# Patient Record
Sex: FEMALE | Race: BLACK OR AFRICAN AMERICAN | Employment: UNEMPLOYED | URBAN - METROPOLITAN AREA
[De-identification: names, ages, dates, MRNs, and addresses within clinical notes are randomized per-mention and may not be internally consistent; named-entity substitution may affect disease eponyms.]

---

## 2019-03-21 ENCOUNTER — HOSPITAL ENCOUNTER (INPATIENT)
Age: 44
LOS: 2 days | Discharge: HOME | DRG: 178 | End: 2019-03-23
Attending: EMERGENCY MEDICINE | Admitting: INTERNAL MEDICINE
Payer: MEDICAID

## 2019-03-21 ENCOUNTER — APPOINTMENT (OUTPATIENT)
Dept: GENERAL RADIOLOGY | Age: 44
DRG: 178 | End: 2019-03-21

## 2019-03-21 ENCOUNTER — APPOINTMENT (OUTPATIENT)
Dept: CT IMAGING | Age: 44
DRG: 178 | End: 2019-03-21

## 2019-03-21 DIAGNOSIS — J18.9 PNEUMONIA DUE TO ORGANISM: Primary | ICD-10-CM

## 2019-03-21 DIAGNOSIS — N17.9 AKI (ACUTE KIDNEY INJURY) (HCC): ICD-10-CM

## 2019-03-21 DIAGNOSIS — R09.02 HYPOXIA: ICD-10-CM

## 2019-03-21 DIAGNOSIS — J69.0 ASPIRATION PNEUMONIA OF RIGHT LOWER LOBE DUE TO GASTRIC SECRETIONS (HCC): ICD-10-CM

## 2019-03-21 DIAGNOSIS — F19.10 POLYSUBSTANCE ABUSE (HCC): ICD-10-CM

## 2019-03-21 DIAGNOSIS — T79.6XXA TRAUMATIC RHABDOMYOLYSIS, INITIAL ENCOUNTER (HCC): ICD-10-CM

## 2019-03-21 PROBLEM — E87.29 RESPIRATORY ACIDOSIS: Status: ACTIVE | Noted: 2019-03-21

## 2019-03-21 PROBLEM — R74.01 TRANSAMINITIS: Status: ACTIVE | Noted: 2019-03-21

## 2019-03-21 PROBLEM — M62.82 NON-TRAUMATIC RHABDOMYOLYSIS: Status: ACTIVE | Noted: 2019-03-21

## 2019-03-21 PROBLEM — G93.41 METABOLIC ENCEPHALOPATHY: Status: ACTIVE | Noted: 2019-03-21

## 2019-03-21 LAB
-: ABNORMAL
ABSOLUTE EOS #: 0 K/UL (ref 0–0.4)
ABSOLUTE IMMATURE GRANULOCYTE: 0 K/UL (ref 0–0.3)
ABSOLUTE LYMPH #: 0.3 K/UL (ref 1–4.8)
ABSOLUTE MONO #: 1.04 K/UL (ref 0.1–0.8)
ACETAMINOPHEN LEVEL: <5 UG/ML (ref 10–30)
ALBUMIN SERPL-MCNC: 4.5 G/DL (ref 3.5–5.2)
ALBUMIN/GLOBULIN RATIO: 1.2 (ref 1–2.5)
ALLEN TEST: ABNORMAL
ALP BLD-CCNC: 72 U/L (ref 35–104)
ALT SERPL-CCNC: 369 U/L (ref 5–33)
AMORPHOUS: ABNORMAL
AMPHETAMINE SCREEN URINE: NEGATIVE
ANION GAP SERPL CALCULATED.3IONS-SCNC: 17 MMOL/L (ref 9–17)
ANION GAP: 11 MMOL/L (ref 7–16)
ANION GAP: 12 MMOL/L (ref 7–16)
ANION GAP: 7 MMOL/L (ref 7–16)
AST SERPL-CCNC: 420 U/L
BACTERIA: ABNORMAL
BARBITURATE SCREEN URINE: NEGATIVE
BASOPHILS # BLD: 0 % (ref 0–2)
BASOPHILS ABSOLUTE: 0 K/UL (ref 0–0.2)
BENZODIAZEPINE SCREEN, URINE: NEGATIVE
BILIRUB SERPL-MCNC: <0.1 MG/DL (ref 0.3–1.2)
BILIRUBIN DIRECT: <0.08 MG/DL
BILIRUBIN URINE: NEGATIVE
BILIRUBIN, INDIRECT: ABNORMAL MG/DL (ref 0–1)
BNP INTERPRETATION: NORMAL
BUN BLDV-MCNC: 19 MG/DL (ref 6–20)
BUN/CREAT BLD: ABNORMAL (ref 9–20)
BUPRENORPHINE URINE: ABNORMAL
CALCIUM SERPL-MCNC: 8.7 MG/DL (ref 8.6–10.4)
CANNABINOID SCREEN URINE: POSITIVE
CASTS UA: ABNORMAL /LPF (ref 0–2)
CHLORIDE BLD-SCNC: 99 MMOL/L (ref 98–107)
CO2: 21 MMOL/L (ref 20–31)
COCAINE METABOLITE, URINE: POSITIVE
COLOR: YELLOW
COMMENT UA: ABNORMAL
CREAT SERPL-MCNC: 1.72 MG/DL (ref 0.5–0.9)
CRYSTALS, UA: ABNORMAL /HPF
DIFFERENTIAL TYPE: ABNORMAL
EOSINOPHILS RELATIVE PERCENT: 0 % (ref 1–4)
EPITHELIAL CELLS UA: ABNORMAL /HPF (ref 0–5)
ETHANOL PERCENT: <0.01 %
ETHANOL: <10 MG/DL
FIO2: ABNORMAL
GFR AFRICAN AMERICAN: 39 ML/MIN
GFR NON-AFRICAN AMERICAN: 32 ML/MIN
GFR NON-AFRICAN AMERICAN: 34 ML/MIN
GFR NON-AFRICAN AMERICAN: 39 ML/MIN
GFR NON-AFRICAN AMERICAN: >60 ML/MIN
GFR SERPL CREATININE-BSD FRML MDRD: 42 ML/MIN
GFR SERPL CREATININE-BSD FRML MDRD: 47 ML/MIN
GFR SERPL CREATININE-BSD FRML MDRD: >60 ML/MIN
GFR SERPL CREATININE-BSD FRML MDRD: ABNORMAL ML/MIN/{1.73_M2}
GFR SERPL CREATININE-BSD FRML MDRD: NORMAL ML/MIN/{1.73_M2}
GLOBULIN: ABNORMAL G/DL (ref 1.5–3.8)
GLUCOSE BLD-MCNC: 100 MG/DL (ref 74–100)
GLUCOSE BLD-MCNC: 108 MG/DL (ref 70–99)
GLUCOSE BLD-MCNC: 150 MG/DL (ref 74–100)
GLUCOSE BLD-MCNC: 77 MG/DL (ref 74–100)
GLUCOSE URINE: NEGATIVE
HCG QUALITATIVE: NEGATIVE
HCO3 VENOUS: 18.3 MMOL/L (ref 22–29)
HCO3 VENOUS: 23.5 MMOL/L (ref 22–29)
HCO3 VENOUS: 24.8 MMOL/L (ref 22–29)
HCT VFR BLD CALC: 38 % (ref 36.3–47.1)
HEMOGLOBIN: 12.2 G/DL (ref 11.9–15.1)
IMMATURE GRANULOCYTES: 0 %
INR BLD: 1
KETONES, URINE: NEGATIVE
LEUKOCYTE ESTERASE, URINE: NEGATIVE
LIPASE: 21 U/L (ref 13–60)
LYMPHOCYTES # BLD: 2 % (ref 24–44)
MAGNESIUM: 2.2 MG/DL (ref 1.6–2.6)
MCH RBC QN AUTO: 30.2 PG (ref 25.2–33.5)
MCHC RBC AUTO-ENTMCNC: 32.1 G/DL (ref 28.4–34.8)
MCV RBC AUTO: 94.1 FL (ref 82.6–102.9)
MDMA URINE: ABNORMAL
METHADONE SCREEN, URINE: NEGATIVE
METHAMPHETAMINE, URINE: ABNORMAL
MODE: ABNORMAL
MONOCYTES # BLD: 7 % (ref 1–7)
MORPHOLOGY: NORMAL
MUCUS: ABNORMAL
MYOGLOBIN: 802 NG/ML (ref 25–58)
NEGATIVE BASE EXCESS, VEN: 5 (ref 0–2)
NEGATIVE BASE EXCESS, VEN: 5 (ref 0–2)
NEGATIVE BASE EXCESS, VEN: 8 (ref 0–2)
NITRITE, URINE: NEGATIVE
NRBC AUTOMATED: 0 PER 100 WBC
O2 DEVICE/FLOW/%: ABNORMAL
O2 SAT, VEN: 30 % (ref 60–85)
O2 SAT, VEN: 42 % (ref 60–85)
O2 SAT, VEN: 53 % (ref 60–85)
OPIATES, URINE: NEGATIVE
OTHER OBSERVATIONS UA: ABNORMAL
OXYCODONE SCREEN URINE: NEGATIVE
PARTIAL THROMBOPLASTIN TIME: 23.1 SEC (ref 20.5–30.5)
PATIENT TEMP: ABNORMAL
PCO2, VEN: 39.9 MM HG (ref 41–51)
PCO2, VEN: 57.5 MM HG (ref 41–51)
PCO2, VEN: 68.9 MM HG (ref 41–51)
PDW BLD-RTO: 13.7 % (ref 11.8–14.4)
PH UA: 5 (ref 5–8)
PH VENOUS: 7.16 (ref 7.32–7.43)
PH VENOUS: 7.22 (ref 7.32–7.43)
PH VENOUS: 7.27 (ref 7.32–7.43)
PHENCYCLIDINE, URINE: NEGATIVE
PHOSPHORUS: 5.8 MG/DL (ref 2.6–4.5)
PLATELET # BLD: 258 K/UL (ref 138–453)
PLATELET ESTIMATE: ABNORMAL
PMV BLD AUTO: 9.1 FL (ref 8.1–13.5)
PO2, VEN: 23.4 MM HG (ref 30–50)
PO2, VEN: 30.6 MM HG (ref 30–50)
PO2, VEN: 31.8 MM HG (ref 30–50)
POC CHLORIDE: 102 MMOL/L (ref 98–107)
POC CHLORIDE: 105 MMOL/L (ref 98–107)
POC CHLORIDE: 116 MMOL/L (ref 98–107)
POC CREATININE: 0.87 MG/DL (ref 0.51–1.19)
POC CREATININE: 1.46 MG/DL (ref 0.51–1.19)
POC CREATININE: 1.63 MG/DL (ref 0.51–1.19)
POC HEMATOCRIT: 27 % (ref 36–46)
POC HEMATOCRIT: 38 % (ref 36–46)
POC HEMATOCRIT: 41 % (ref 36–46)
POC HEMOGLOBIN: 12.9 G/DL (ref 12–16)
POC HEMOGLOBIN: 13.9 G/DL (ref 12–16)
POC HEMOGLOBIN: 9.1 G/DL (ref 12–16)
POC IONIZED CALCIUM: 0.9 MMOL/L (ref 1.15–1.33)
POC IONIZED CALCIUM: 1.07 MMOL/L (ref 1.15–1.33)
POC IONIZED CALCIUM: 1.11 MMOL/L (ref 1.15–1.33)
POC LACTIC ACID: 1.23 MMOL/L (ref 0.56–1.39)
POC LACTIC ACID: 2.51 MMOL/L (ref 0.56–1.39)
POC LACTIC ACID: 5.06 MMOL/L (ref 0.56–1.39)
POC PCO2 TEMP: ABNORMAL MM HG
POC PH TEMP: ABNORMAL
POC PO2 TEMP: ABNORMAL MM HG
POC POTASSIUM: 3.8 MMOL/L (ref 3.5–4.5)
POC POTASSIUM: 4.3 MMOL/L (ref 3.5–4.5)
POC POTASSIUM: 4.5 MMOL/L (ref 3.5–4.5)
POC SODIUM: 137 MMOL/L (ref 138–146)
POC SODIUM: 137 MMOL/L (ref 138–146)
POC SODIUM: 145 MMOL/L (ref 138–146)
POSITIVE BASE EXCESS, VEN: ABNORMAL (ref 0–3)
POTASSIUM SERPL-SCNC: 4.7 MMOL/L (ref 3.7–5.3)
PRO-BNP: 286 PG/ML
PROPOXYPHENE, URINE: ABNORMAL
PROTEIN UA: ABNORMAL
PROTHROMBIN TIME: 10.5 SEC (ref 9–12)
RBC # BLD: 4.04 M/UL (ref 3.95–5.11)
RBC # BLD: ABNORMAL 10*6/UL
RBC UA: ABNORMAL /HPF (ref 0–2)
RENAL EPITHELIAL, UA: ABNORMAL /HPF
SALICYLATE LEVEL: <1 MG/DL (ref 3–10)
SAMPLE SITE: ABNORMAL
SEG NEUTROPHILS: 91 % (ref 36–66)
SEGMENTED NEUTROPHILS ABSOLUTE COUNT: 13.56 K/UL (ref 1.8–7.7)
SODIUM BLD-SCNC: 137 MMOL/L (ref 135–144)
SPECIFIC GRAVITY UA: 1.02 (ref 1–1.03)
TEST INFORMATION: ABNORMAL
TOTAL CK: 992 U/L (ref 26–192)
TOTAL CO2, VENOUS: 20 MMOL/L (ref 23–30)
TOTAL CO2, VENOUS: 25 MMOL/L (ref 23–30)
TOTAL CO2, VENOUS: 27 MMOL/L (ref 23–30)
TOTAL PROTEIN: 8.2 G/DL (ref 6.4–8.3)
TOXIC TRICYCLIC SC,BLOOD: NEGATIVE
TRICHOMONAS: ABNORMAL
TRICYCLIC ANTIDEPRESSANTS, UR: ABNORMAL
TROPONIN INTERP: ABNORMAL
TROPONIN INTERP: ABNORMAL
TROPONIN T: ABNORMAL NG/ML
TROPONIN T: ABNORMAL NG/ML
TROPONIN, HIGH SENSITIVITY: 42 NG/L (ref 0–14)
TROPONIN, HIGH SENSITIVITY: 51 NG/L (ref 0–14)
TURBIDITY: ABNORMAL
URINE HGB: ABNORMAL
UROBILINOGEN, URINE: NORMAL
WBC # BLD: 14.9 K/UL (ref 3.5–11.3)
WBC # BLD: ABNORMAL 10*3/UL
WBC UA: ABNORMAL /HPF (ref 0–5)
YEAST: ABNORMAL

## 2019-03-21 PROCEDURE — 6360000002 HC RX W HCPCS: Performed by: STUDENT IN AN ORGANIZED HEALTH CARE EDUCATION/TRAINING PROGRAM

## 2019-03-21 PROCEDURE — 83690 ASSAY OF LIPASE: CPT

## 2019-03-21 PROCEDURE — 83874 ASSAY OF MYOGLOBIN: CPT

## 2019-03-21 PROCEDURE — 83605 ASSAY OF LACTIC ACID: CPT

## 2019-03-21 PROCEDURE — 80048 BASIC METABOLIC PNL TOTAL CA: CPT

## 2019-03-21 PROCEDURE — 96375 TX/PRO/DX INJ NEW DRUG ADDON: CPT

## 2019-03-21 PROCEDURE — 82550 ASSAY OF CK (CPK): CPT

## 2019-03-21 PROCEDURE — B32SYZZ COMPUTERIZED TOMOGRAPHY (CT SCAN) OF RIGHT PULMONARY ARTERY USING OTHER CONTRAST: ICD-10-PCS | Performed by: INTERNAL MEDICINE

## 2019-03-21 PROCEDURE — 6360000002 HC RX W HCPCS

## 2019-03-21 PROCEDURE — 96374 THER/PROPH/DIAG INJ IV PUSH: CPT

## 2019-03-21 PROCEDURE — 82803 BLOOD GASES ANY COMBINATION: CPT

## 2019-03-21 PROCEDURE — 82805 BLOOD GASES W/O2 SATURATION: CPT

## 2019-03-21 PROCEDURE — 82947 ASSAY GLUCOSE BLOOD QUANT: CPT

## 2019-03-21 PROCEDURE — 2580000003 HC RX 258: Performed by: STUDENT IN AN ORGANIZED HEALTH CARE EDUCATION/TRAINING PROGRAM

## 2019-03-21 PROCEDURE — 82330 ASSAY OF CALCIUM: CPT

## 2019-03-21 PROCEDURE — 81001 URINALYSIS AUTO W/SCOPE: CPT

## 2019-03-21 PROCEDURE — 6360000004 HC RX CONTRAST MEDICATION: Performed by: EMERGENCY MEDICINE

## 2019-03-21 PROCEDURE — 84300 ASSAY OF URINE SODIUM: CPT

## 2019-03-21 PROCEDURE — 6360000002 HC RX W HCPCS: Performed by: EMERGENCY MEDICINE

## 2019-03-21 PROCEDURE — 96376 TX/PRO/DX INJ SAME DRUG ADON: CPT

## 2019-03-21 PROCEDURE — 84100 ASSAY OF PHOSPHORUS: CPT

## 2019-03-21 PROCEDURE — 80076 HEPATIC FUNCTION PANEL: CPT

## 2019-03-21 PROCEDURE — 85730 THROMBOPLASTIN TIME PARTIAL: CPT

## 2019-03-21 PROCEDURE — 6370000000 HC RX 637 (ALT 250 FOR IP): Performed by: EMERGENCY MEDICINE

## 2019-03-21 PROCEDURE — G0480 DRUG TEST DEF 1-7 CLASSES: HCPCS

## 2019-03-21 PROCEDURE — 80307 DRUG TEST PRSMV CHEM ANLYZR: CPT

## 2019-03-21 PROCEDURE — 80074 ACUTE HEPATITIS PANEL: CPT

## 2019-03-21 PROCEDURE — 84295 ASSAY OF SERUM SODIUM: CPT

## 2019-03-21 PROCEDURE — 84132 ASSAY OF SERUM POTASSIUM: CPT

## 2019-03-21 PROCEDURE — B32TYZZ COMPUTERIZED TOMOGRAPHY (CT SCAN) OF LEFT PULMONARY ARTERY USING OTHER CONTRAST: ICD-10-PCS | Performed by: INTERNAL MEDICINE

## 2019-03-21 PROCEDURE — 85014 HEMATOCRIT: CPT

## 2019-03-21 PROCEDURE — 99285 EMERGENCY DEPT VISIT HI MDM: CPT

## 2019-03-21 PROCEDURE — 82435 ASSAY OF BLOOD CHLORIDE: CPT

## 2019-03-21 PROCEDURE — 85610 PROTHROMBIN TIME: CPT

## 2019-03-21 PROCEDURE — 93005 ELECTROCARDIOGRAM TRACING: CPT

## 2019-03-21 PROCEDURE — 82565 ASSAY OF CREATININE: CPT

## 2019-03-21 PROCEDURE — 84703 CHORIONIC GONADOTROPIN ASSAY: CPT

## 2019-03-21 PROCEDURE — 84484 ASSAY OF TROPONIN QUANT: CPT

## 2019-03-21 PROCEDURE — 2060000000 HC ICU INTERMEDIATE R&B

## 2019-03-21 PROCEDURE — 71045 X-RAY EXAM CHEST 1 VIEW: CPT

## 2019-03-21 PROCEDURE — 83880 ASSAY OF NATRIURETIC PEPTIDE: CPT

## 2019-03-21 PROCEDURE — 2580000003 HC RX 258: Performed by: EMERGENCY MEDICINE

## 2019-03-21 PROCEDURE — 71260 CT THORAX DX C+: CPT

## 2019-03-21 PROCEDURE — 87040 BLOOD CULTURE FOR BACTERIA: CPT

## 2019-03-21 PROCEDURE — 70450 CT HEAD/BRAIN W/O DYE: CPT

## 2019-03-21 PROCEDURE — 85025 COMPLETE CBC W/AUTO DIFF WBC: CPT

## 2019-03-21 PROCEDURE — 36415 COLL VENOUS BLD VENIPUNCTURE: CPT

## 2019-03-21 PROCEDURE — 83735 ASSAY OF MAGNESIUM: CPT

## 2019-03-21 PROCEDURE — 82570 ASSAY OF URINE CREATININE: CPT

## 2019-03-21 RX ORDER — SODIUM CHLORIDE 9 MG/ML
INJECTION, SOLUTION INTRAVENOUS CONTINUOUS
Status: ACTIVE | OUTPATIENT
Start: 2019-03-21 | End: 2019-03-22

## 2019-03-21 RX ORDER — ONDANSETRON 2 MG/ML
4 INJECTION INTRAMUSCULAR; INTRAVENOUS ONCE
Status: COMPLETED | OUTPATIENT
Start: 2019-03-21 | End: 2019-03-21

## 2019-03-21 RX ORDER — NALOXONE HYDROCHLORIDE 0.4 MG/ML
0.4 INJECTION, SOLUTION INTRAMUSCULAR; INTRAVENOUS; SUBCUTANEOUS ONCE
Status: COMPLETED | OUTPATIENT
Start: 2019-03-21 | End: 2019-03-21

## 2019-03-21 RX ORDER — NALOXONE HYDROCHLORIDE 1 MG/ML
INJECTION INTRAMUSCULAR; INTRAVENOUS; SUBCUTANEOUS
Status: DISPENSED
Start: 2019-03-21 | End: 2019-03-21

## 2019-03-21 RX ORDER — SODIUM CHLORIDE 9 MG/ML
INJECTION, SOLUTION INTRAVENOUS CONTINUOUS
Status: DISCONTINUED | OUTPATIENT
Start: 2019-03-21 | End: 2019-03-21

## 2019-03-21 RX ORDER — SODIUM CHLORIDE 0.9 % (FLUSH) 0.9 %
10 SYRINGE (ML) INJECTION PRN
Status: CANCELLED | OUTPATIENT
Start: 2019-03-21

## 2019-03-21 RX ORDER — ONDANSETRON 2 MG/ML
4 INJECTION INTRAMUSCULAR; INTRAVENOUS EVERY 6 HOURS PRN
Status: DISCONTINUED | OUTPATIENT
Start: 2019-03-21 | End: 2019-03-22

## 2019-03-21 RX ORDER — ONDANSETRON 2 MG/ML
INJECTION INTRAMUSCULAR; INTRAVENOUS
Status: COMPLETED
Start: 2019-03-21 | End: 2019-03-21

## 2019-03-21 RX ORDER — 0.9 % SODIUM CHLORIDE 0.9 %
1000 INTRAVENOUS SOLUTION INTRAVENOUS ONCE
Status: COMPLETED | OUTPATIENT
Start: 2019-03-21 | End: 2019-03-21

## 2019-03-21 RX ORDER — NALOXONE HYDROCHLORIDE 0.4 MG/ML
0.4 INJECTION, SOLUTION INTRAMUSCULAR; INTRAVENOUS; SUBCUTANEOUS ONCE
Status: DISCONTINUED | OUTPATIENT
Start: 2019-03-21 | End: 2019-03-22

## 2019-03-21 RX ORDER — SODIUM CHLORIDE 0.9 % (FLUSH) 0.9 %
10 SYRINGE (ML) INJECTION EVERY 12 HOURS SCHEDULED
Status: CANCELLED | OUTPATIENT
Start: 2019-03-21

## 2019-03-21 RX ORDER — ACETAMINOPHEN 325 MG/1
650 TABLET ORAL EVERY 4 HOURS PRN
Status: DISCONTINUED | OUTPATIENT
Start: 2019-03-21 | End: 2019-03-23 | Stop reason: HOSPADM

## 2019-03-21 RX ORDER — AZITHROMYCIN 250 MG/1
500 TABLET, FILM COATED ORAL ONCE
Status: COMPLETED | OUTPATIENT
Start: 2019-03-21 | End: 2019-03-21

## 2019-03-21 RX ADMIN — CEFTRIAXONE SODIUM 1 G: 1 INJECTION, POWDER, FOR SOLUTION INTRAMUSCULAR; INTRAVENOUS at 12:13

## 2019-03-21 RX ADMIN — IOVERSOL 75 ML: 741 INJECTION INTRA-ARTERIAL; INTRAVENOUS at 11:03

## 2019-03-21 RX ADMIN — SODIUM CHLORIDE 1000 ML: 9 INJECTION, SOLUTION INTRAVENOUS at 10:41

## 2019-03-21 RX ADMIN — ENOXAPARIN SODIUM 40 MG: 40 INJECTION SUBCUTANEOUS at 18:35

## 2019-03-21 RX ADMIN — SODIUM CHLORIDE: 9 INJECTION, SOLUTION INTRAVENOUS at 20:03

## 2019-03-21 RX ADMIN — ONDANSETRON HYDROCHLORIDE 4 MG: 2 INJECTION, SOLUTION INTRAMUSCULAR; INTRAVENOUS at 11:58

## 2019-03-21 RX ADMIN — ONDANSETRON 4 MG: 2 INJECTION INTRAMUSCULAR; INTRAVENOUS at 19:46

## 2019-03-21 RX ADMIN — ONDANSETRON HYDROCHLORIDE 4 MG: 2 INJECTION, SOLUTION INTRAMUSCULAR; INTRAVENOUS at 15:31

## 2019-03-21 RX ADMIN — AZITHROMYCIN 500 MG: 250 TABLET, FILM COATED ORAL at 12:46

## 2019-03-21 RX ADMIN — ONDANSETRON 4 MG: 2 INJECTION INTRAMUSCULAR; INTRAVENOUS at 11:58

## 2019-03-21 RX ADMIN — NALOXONE HYDROCHLORIDE 0.4 MG: 0.4 INJECTION, SOLUTION INTRAMUSCULAR; INTRAVENOUS; SUBCUTANEOUS at 11:54

## 2019-03-21 ASSESSMENT — ENCOUNTER SYMPTOMS
TROUBLE SWALLOWING: 0
COUGH: 0
SHORTNESS OF BREATH: 0
VOMITING: 0
ABDOMINAL PAIN: 0
NAUSEA: 1
BACK PAIN: 0
WHEEZING: 0
SORE THROAT: 0
ABDOMINAL DISTENTION: 0

## 2019-03-21 NOTE — ED PROVIDER NOTES
[address]   Emergency Department Encounter  Emergency Medicine Resident     Pt Name: Wilfrid Curry  MRN: [id number]  [place] 1975  Date of evaluation: 3/21/19  PCP:  No primary care provider on file. CHIEF COMPLAINT       Chief Complaint   Patient presents with    Fatigue     Pt reports hanging out with friend last night, drank some beers and smoked some marijuana and indulged cocaine. Pt reports cocaine may have been laced with something because she has \"never felt this way. \" No c/o chest pain. HISTORY OF PRESENT ILLNESS  (Location/Symptom, Timing/Onset, Context/Setting, Quality, Duration, Modifying Factors, Severity.)    Wilfrid Curry is a 39 y.o. female who presents with fatigue and altered mental status. Patient admitted to polysubstance abuse tonight before coming emergency room. Patient admitted to using alcohol, marijuana, and cocaine. Patient states that she has used cocaine in the past but has never felt as fatigue nor as sluggish and she is now. Patient believes that the cocaine may have been laced with something. Patient states that she does not remember how many alcoholic drink she had and states that she had a \"30 bag\" of cocaine last night. Patient is arousable to stimuli. Patient responds appropriately to questions. She is conscious, and oriented ×4. She is sluggish to respond but does answer questions appropriately. Patient states she does not remember large portions of the night. PAST MEDICAL / SURGICAL / SOCIAL / FAMILY HISTORY    has a past medical history of PTSD (post-traumatic stress disorder). has a past surgical history that includes Rotator cuff repair (2003).     Social History     Socioeconomic History    Marital status:      Spouse name: Not on file    Number of children: Not on file    Years of education: Not on file    Highest education level: Not on file   Occupational History    Not on file   Social Needs    Negative for pallor. Neurological: Negative for dizziness and weakness. Psychiatric/Behavioral: Negative for confusion. The patient is not nervous/anxious. All other systems reviewed and are negative. PHYSICAL EXAM   (up to 7 for level 4, 8 or more for level 5)    INITIAL VITALS:   ED Triage Vitals   BP Temp Temp Source Pulse Resp SpO2 Height Weight   03/21/19 1010 03/21/19 1005 03/21/19 1005 03/21/19 1010 03/21/19 1312 03/21/19 1005 03/21/19 1005 03/21/19 1005   91/71 97.4 °F (36.3 °C) Oral 76 17 92 % 5' 8\" (1.727 m) 200 lb (90.7 kg)       Physical Exam   Constitutional: She is oriented to person, place, and time. She appears well-developed and well-nourished. She does not appear ill. No distress. HENT:   Head: Normocephalic. Head is without raccoon's eyes, without Adair's sign, without abrasion, without contusion, without laceration, without right periorbital erythema and without left periorbital erythema. Nose:       Mouth/Throat: Uvula is midline, oropharynx is clear and moist and mucous membranes are normal. Normal dentition. Small, nonbleeding abrasion to the patient's nose. Swelling without bruising to patient's upper right lip   Eyes: Pupils are equal, round, and reactive to light. Conjunctivae and EOM are normal.   Neck: Normal range of motion. No JVD present. No spinous process tenderness and no muscular tenderness present. No tracheal deviation and normal range of motion present. Cardiovascular: Normal rate, regular rhythm, normal heart sounds and intact distal pulses. No murmur heard. Pulses:       Carotid pulses are 2+ on the right side, and 2+ on the left side. Radial pulses are 2+ on the right side, and 2+ on the left side. Pulmonary/Chest: Effort normal and breath sounds normal. No respiratory distress. She has no decreased breath sounds. She has no wheezes. Abdominal: Soft. Normal appearance and bowel sounds are normal. She exhibits no distension.  There is no tenderness. Musculoskeletal: She exhibits no edema or deformity. Neurological: She is oriented to person, place, and time. She has normal strength. She is not disoriented. Coordination normal.   Skin: Skin is warm and dry. Capillary refill takes less than 2 seconds. Psychiatric: She has a normal mood and affect. Her behavior is normal. Thought content normal. Her speech is delayed. She exhibits abnormal recent memory. Nursing note and vitals reviewed. DIFFERENTIAL  DIAGNOSIS   PLAN (LABS / IMAGING / EKG):  Orders Placed This Encounter   Procedures    Culture Blood #1    Culture Blood #1    XR CHEST PORTABLE    CT Chest Pulmonary Embolism W Contrast    CT Head WO Contrast    CBC Auto Differential    Basic Metabolic Panel    Protime-INR    APTT    Brain Natriuretic Peptide    Lipase    Hepatic Function Panel    HCG Qualitative, Serum    Magnesium    Phosphorus    Urinalysis Reflex to Culture    TOX SCR, BLD, ED    Troponin    Hemoglobin and hematocrit, blood    SODIUM (POC)    POTASSIUM (POC)    CHLORIDE (POC)    CALCIUM, IONIC (POC)    Hemoglobin and hematocrit, blood    SODIUM (POC)    POTASSIUM (POC)    CHLORIDE (POC)    CALCIUM, IONIC (POC)    Microscopic Urinalysis    Urine Drug Screen    Blood Gas, Venous    Hemoglobin and hematocrit, blood    SODIUM (POC)    POTASSIUM (POC)    CHLORIDE (POC)    CALCIUM, IONIC (POC)    CK    Myoglobin, Serum    CK    DIET GENERAL;    Vital signs per unit routine    Notify physician    Up with assistance    Telemetry Monitoring    Full Code    Inpatient consult to Critical Care    Inpatient consult to Internal Medicine    POC Blood Gas and Chemistry    POC G4: LACTIC ACID,BLOOD GAS INCLUDES CALC.  BASE EXCESS, SO2    Venous Blood Gas, POC    Creatinine W/GFR Point of Care    Lactic Acid, POC    POCT Glucose    Anion Gap (Calc) POC    Venous Blood Gas, POC    Creatinine W/GFR Point of Care    Lactic Acid, POC    POC Lactic Acid 2.51 (*)     All other components within normal limits   VENOUS BLOOD GAS, POINT OF CARE - Abnormal; Notable for the following components:    pH, Henri 7.269 (*)     pCO2, Henri 39.9 (*)     HCO3, Venous 18.3 (*)     Total CO2, Venous 20 (*)     Negative Base Excess, Henri 8 (*)     O2 Sat, Henri 53 (*)     All other components within normal limits   CULTURE BLOOD #1   CULTURE BLOOD #1   PROTIME-INR   APTT   BRAIN NATRIURETIC PEPTIDE   LIPASE   HCG, SERUM, QUALITATIVE   MAGNESIUM   HGB/HCT   POTASSIUM (POC)   CHLORIDE (POC)   HGB/HCT   POTASSIUM (POC)   CHLORIDE (POC)   SODIUM (POC)   POTASSIUM (POC)   BLOOD GAS, VENOUS   CK   POC BLOOD GAS AND CHEMISTRY   POC G4: LACTIC ACID,BLOOD GAS INCLUDES CALC. BASE EXCESS, SO2   ANION GAP (CALC) POC   POCT GLUCOSE   ANION GAP (CALC) POC   CREATININE W/GFR POINT OF CARE   LACTIC ACID,POINT OF CARE   POCT GLUCOSE   ANION GAP (CALC) POC       RADIOLOGY:  Ct Head Wo Contrast    Result Date: 3/21/2019  EXAMINATION: CT OF THE HEAD WITHOUT CONTRAST  3/21/2019 10:54 am TECHNIQUE: CT of the head was performed without the administration of intravenous contrast. Dose modulation, iterative reconstruction, and/or weight based adjustment of the mA/kV was utilized to reduce the radiation dose to as low as reasonably achievable. COMPARISON: None. HISTORY: ORDERING SYSTEM PROVIDED HISTORY: Patient with traumatic head injury TECHNOLOGIST PROVIDED HISTORY: FINDINGS: BRAIN/VENTRICLES: There is no acute intracranial hemorrhage, mass effect or midline shift. No abnormal extra-axial fluid collection. The gray-white differentiation is maintained without evidence of an acute infarct. There is no evidence of hydrocephalus. ORBITS: The visualized portion of the orbits demonstrate no acute abnormality. SINUSES: The visualized paranasal sinuses and mastoid air cells demonstrate no acute abnormality. Mild mucoperiosteal thickening along the walls of the maxillary sinuses and ethmoid air cells. SOFT TISSUES/SKULL:  No acute abnormality of the visualized skull or soft tissues. No acute intracranial abnormality. Xr Chest Portable    Result Date: 3/21/2019  EXAMINATION: SINGLE XRAY VIEW OF THE CHEST 3/21/2019 10:48 am COMPARISON: February 19, 2012 HISTORY: ORDERING SYSTEM PROVIDED HISTORY: Shortness of breath and fatigue with hypoxia TECHNOLOGIST PROVIDED HISTORY: Shortness of breath and fatigue with hypoxia FINDINGS: The cardiomediastinal silhouette is normal in size. There is patchy airspace disease involving the right lower lobe consistent with pneumonia. No pleural effusion or pneumothorax is present. Acute right lower lobe pneumonia. Radiographic follow-up recommended to ensure resolution. Ct Chest Pulmonary Embolism W Contrast    Result Date: 3/21/2019  EXAMINATION: CTA OF THE CHEST 3/21/2019 10:54 am TECHNIQUE: CTA of the chest was performed after the administration of intravenous contrast.  Multiplanar reformatted images are provided for review. MIP images are provided for review. Dose modulation, iterative reconstruction, and/or weight based adjustment of the mA/kV was utilized to reduce the radiation dose to as low as reasonably achievable. COMPARISON: Chest radiograph today. HISTORY: ORDERING SYSTEM PROVIDED HISTORY: SHORTNESS OF BREATH TECHNOLOGIST PROVIDED HISTORY: Ordering Physician Provided Reason for Exam: fatigue Acuity: Unknown Type of Exam: Unknown Additional signs and symptoms: pt drank some beers, smoked some marijuana, and did cocaine last night. Does not feel right now Relevant Medical/Surgical History: no history except allergy to darvocet FINDINGS: Pulmonary Arteries: Pulmonary arteries are adequately opacified for evaluation. Dilution of contrast and respiratory motion artifact degrades evaluation of the subsegmental branches. No evidence of intraluminal filling defect to suggest pulmonary embolism. Main pulmonary artery is normal in caliber.  Mediastinum: No evidence of mediastinal lymphadenopathy. The heart and pericardium demonstrate no acute abnormality. There is no acute abnormality of the thoracic aorta. Small amount of fluid in the thoracic esophagus. Lungs/pleura: Respiratory motion artifact and expiratory phase of imaging. Regional areas of alveolar airspace disease are demonstrated bilaterally, more localized in the right upper lobe and perihilar right lower lobe. No pneumothorax. No effusion. The airway is patent. Upper Abdomen: No acute findings. There appears to be a small posterior gastric diverticulum. Soft Tissues/Bones: No acute bone or soft tissue abnormality. No evidence of pulmonary embolism. Bilateral airspace disease, right greater than left consistent with inflammatory process or infection. There is also mild esophageal distention with fluid consistent with reflux. Aspiration should also be considered. EKG    EKG Interpretation    Interpreted by emergency department physician at 1030    Rhythm: normal sinus   Rate: normal  Axis: normal  Ectopy: none  Conduction: normal  ST Segments: normal  T Waves: normal  Q Waves: none    Clinical Impression: Sinus rhythm, rate of 87. No ST segment changes. No arrhythmia or ectopy seen    All EKG's are interpreted by the Emergency Department Physician whoeither signs or Co-signs this chart in the absence of a cardiologist.    EMERGENCY DEPARTMENT COURSE:       MDM  Number of Diagnoses or Management Options  Hypoxia: new, needed workup  Pneumonia due to organism: new, needed workup  Polysubstance abuse [facility]): new, needed workup  Traumatic rhabdomyolysis, initial encounter [facility]): new, needed workup  Diagnosis management comments: The patient's drug use last night, concerned that the cocaine was indeed placed with another substance. Patient believed that it may have been laced with fentanyl. Patient was sluggish, fatigue.   Patient did initially respond to her 0.5 mg of Narcan which resulted in increased mental alertness and vomiting. However patient's urine drug screen did not test positive for opiates. Patient did test positive for cocaine and for marijuana but not for opiates. To the fact the patient is cocaine could've been laced with an unknown substance in addition to the hypoxia-resolving-and bilateral pneumonia in addition to patient's rhabdomyolysis patient to be admitted to this medical stepdown service. ICU resident did evaluate patient recommended the patient go to stepdown service. Internal medicine accepted and admitted the patient. Signout was given to Dr. Vimal Naqvi at 2000 hours. Amount and/or Complexity of Data Reviewed  Clinical lab tests: reviewed and ordered  Tests in the radiology section of CPT®: ordered and reviewed  Tests in the medicine section of CPT®: ordered and reviewed  Decide to obtain previous medical records or to obtain history from someone other than the patient: yes  Review and summarize past medical records: yes  Discuss the patient with other providers: yes  Independent visualization of images, tracings, or specimens: yes    Risk of Complications, Morbidity, and/or Mortality  Presenting problems: high  Diagnostic procedures: high  Management options: high    Patient Progress  Patient progress: stable      PROCEDURES:  none    CONSULTS:  IP CONSULT TO CRITICAL CARE  IP CONSULT TO INTERNAL MEDICINE    CRITICAL CARE:  Please see attending note    FINAL IMPRESSION     1. Pneumonia due to organism    2. Hypoxia    3. Traumatic rhabdomyolysis, initial encounter [facility])         [address] / [address]  Admitted 03/21/2019 05:04:55 PM      Evaluation and treatment course in the ED, and plan of care upon discharge was discussed in length with the patient. Patient had no further questions prior to being discharged and was instructed to return to the ED for new or worsening symptoms.   Any changes to existing medications or new prescriptions were reviewed with

## 2019-03-21 NOTE — ED PROVIDER NOTES
[facility] ED  Emergency Department  Emergency Medicine Resident Sign-out     Care of Alexandra Bumps was assumed from Dr. Zulema Vasquez and is being seen for Fatigue (Pt reports hanging out with friend last night, drank some beers and smoked some marijuana and indulged cocaine. Pt reports cocaine may have been laced with something because she has \"never felt this way. \" No c/o chest pain. )  . The patient's initial evaluation and plan have been discussed with the prior provider who initially evaluated the patient.      EMERGENCY DEPARTMENT COURSE / MEDICAL DECISION MAKING:       MEDICATIONS GIVEN:  Orders Placed This Encounter   Medications    0.9 % sodium chloride bolus    ioversol (OPTIRAY) 74 % injection 75 mL    cefTRIAXone (ROCEPHIN) 1 g IVPB in 50 mL D5W minibag    azithromycin (ZITHROMAX) tablet 500 mg    naloxone (NARCAN) 2 MG/2ML injection     ALMA BLACKMON: cabinet override    ondansetron (ZOFRAN) 4 MG/2ML injection     ALMA BLACKMON: cabinet override    ondansetron (ZOFRAN) injection 4 mg    naloxone (NARCAN) injection 0.4 mg    naloxone (NARCAN) injection 0.4 mg    ondansetron (ZOFRAN) injection 4 mg    acetaminophen (TYLENOL) tablet 650 mg    enoxaparin (LOVENOX) injection 40 mg    ondansetron (ZOFRAN) injection 4 mg       LABS / RADIOLOGY:     Labs Reviewed   CBC WITH AUTO DIFFERENTIAL - Abnormal; Notable for the following components:       Result Value    WBC 14.9 (*)     Seg Neutrophils 91 (*)     Lymphocytes 2 (*)     Eosinophils % 0 (*)     Segs Absolute 13.56 (*)     Absolute Lymph # 0.30 (*)     Absolute Mono # 1.04 (*)     All other components within normal limits   BASIC METABOLIC PANEL - Abnormal; Notable for the following components:    Glucose 108 (*)     CREATININE 1.72 (*)     GFR Non- 32 (*)     GFR  39 (*)     All other components within normal limits   TROPONIN - Abnormal; Notable for the following components: GAS, POINT OF CARE - Abnormal; Notable for the following components:    pH, Henri 7.269 (*)     pCO2, Henri 39.9 (*)     HCO3, Venous 18.3 (*)     Total CO2, Venous 20 (*)     Negative Base Excess, Henri 8 (*)     O2 Sat, Henri 53 (*)     All other components within normal limits   CULTURE BLOOD #1   CULTURE BLOOD #1   PROTIME-INR   APTT   BRAIN NATRIURETIC PEPTIDE   LIPASE   HCG, SERUM, QUALITATIVE   MAGNESIUM   HGB/HCT   POTASSIUM (POC)   CHLORIDE (POC)   HGB/HCT   POTASSIUM (POC)   CHLORIDE (POC)   SODIUM (POC)   POTASSIUM (POC)   BLOOD GAS, VENOUS   POC BLOOD GAS AND CHEMISTRY   POC G4: LACTIC ACID,BLOOD GAS INCLUDES CALC. BASE EXCESS, SO2   ANION GAP (CALC) POC   POCT GLUCOSE   ANION GAP (CALC) POC   CREATININE W/GFR POINT OF CARE   LACTIC ACID,POINT OF CARE   POCT GLUCOSE   ANION GAP (CALC) POC       Ct Head Wo Contrast    Result Date: 3/21/2019  EXAMINATION: CT OF THE HEAD WITHOUT CONTRAST  3/21/2019 10:54 am TECHNIQUE: CT of the head was performed without the administration of intravenous contrast. Dose modulation, iterative reconstruction, and/or weight based adjustment of the mA/kV was utilized to reduce the radiation dose to as low as reasonably achievable. COMPARISON: None. HISTORY: ORDERING SYSTEM PROVIDED HISTORY: Patient with traumatic head injury TECHNOLOGIST PROVIDED HISTORY: FINDINGS: BRAIN/VENTRICLES: There is no acute intracranial hemorrhage, mass effect or midline shift. No abnormal extra-axial fluid collection. The gray-white differentiation is maintained without evidence of an acute infarct. There is no evidence of hydrocephalus. ORBITS: The visualized portion of the orbits demonstrate no acute abnormality. SINUSES: The visualized paranasal sinuses and mastoid air cells demonstrate no acute abnormality. Mild mucoperiosteal thickening along the walls of the maxillary sinuses and ethmoid air cells. SOFT TISSUES/SKULL:  No acute abnormality of the visualized skull or soft tissues.      No acute intracranial abnormality. Xr Chest Portable    Result Date: 3/21/2019  EXAMINATION: SINGLE XRAY VIEW OF THE CHEST 3/21/2019 10:48 am COMPARISON: February 19, 2012 HISTORY: ORDERING SYSTEM PROVIDED HISTORY: Shortness of breath and fatigue with hypoxia TECHNOLOGIST PROVIDED HISTORY: Shortness of breath and fatigue with hypoxia FINDINGS: The cardiomediastinal silhouette is normal in size. There is patchy airspace disease involving the right lower lobe consistent with pneumonia. No pleural effusion or pneumothorax is present. Acute right lower lobe pneumonia. Radiographic follow-up recommended to ensure resolution. Ct Chest Pulmonary Embolism W Contrast    Result Date: 3/21/2019  EXAMINATION: CTA OF THE CHEST 3/21/2019 10:54 am TECHNIQUE: CTA of the chest was performed after the administration of intravenous contrast.  Multiplanar reformatted images are provided for review. MIP images are provided for review. Dose modulation, iterative reconstruction, and/or weight based adjustment of the mA/kV was utilized to reduce the radiation dose to as low as reasonably achievable. COMPARISON: Chest radiograph today. HISTORY: ORDERING SYSTEM PROVIDED HISTORY: SHORTNESS OF BREATH TECHNOLOGIST PROVIDED HISTORY: Ordering Physician Provided Reason for Exam: fatigue Acuity: Unknown Type of Exam: Unknown Additional signs and symptoms: pt drank some beers, smoked some marijuana, and did cocaine last night. Does not feel right now Relevant Medical/Surgical History: no history except allergy to darvocet FINDINGS: Pulmonary Arteries: Pulmonary arteries are adequately opacified for evaluation. Dilution of contrast and respiratory motion artifact degrades evaluation of the subsegmental branches. No evidence of intraluminal filling defect to suggest pulmonary embolism. Main pulmonary artery is normal in caliber. Mediastinum: No evidence of mediastinal lymphadenopathy.   The heart and pericardium demonstrate no acute abnormality. There is no acute abnormality of the thoracic aorta. Small amount of fluid in the thoracic esophagus. Lungs/pleura: Respiratory motion artifact and expiratory phase of imaging. Regional areas of alveolar airspace disease are demonstrated bilaterally, more localized in the right upper lobe and perihilar right lower lobe. No pneumothorax. No effusion. The airway is patent. Upper Abdomen: No acute findings. There appears to be a small posterior gastric diverticulum. Soft Tissues/Bones: No acute bone or soft tissue abnormality. No evidence of pulmonary embolism. Bilateral airspace disease, right greater than left consistent with inflammatory process or infection. There is also mild esophageal distention with fluid consistent with reflux. Aspiration should also be considered. RECENT VITALS:     Temp: 97.4 °F (36.3 °C),  Pulse: 89, Resp: 17, BP: 129/87, SpO2: 98 %    This patient is a 39 y.o. Female with altered mental status, polysubstance abuse. Responded to Narcan. Incidental finding of pneumonia. Patient admitted to stepdown ICU. OUTSTANDING TASKS / RECOMMENDATIONS:    1. Awaiting bed     FINAL IMPRESSION:     1. Pneumonia due to organism    2. Hypoxia        DISPOSITION:         DISPOSITION:  []  Discharge   []  Transfer -    [x]  Admission -     []  Against Medical Advice   []  Eloped   FOLLOW-UP: No follow-up provider specified.    DISCHARGE MEDICATIONS: New Prescriptions    No medications on file          Trung Taylor MD  Emergency Medicine Resident  [id number] MD  03/22/19 5483

## 2019-03-21 NOTE — ED PROVIDER NOTES
bilateral infiltrates right greater than left. Will order antibiotics as well as blood cultures. Patient's initial lactate slightly over 2 and will need repeated however VBG shows respiratory acidosis with pH 7.16. Given patient's possible narcotic toxidrome will give Narcan 0.4 mg. After this was done patient woke up much more and has episodes of nausea and vomiting this treated with Zofran and we'll repeat VBG and one hour. Half an hour after Zofran and Narcan were given repeat VBG shows improvement with pH increasing the 7.22 and PCO2 descending to 57. Patient has now had resolution of PCO2 retention and pH is up to 7.26 with improving lactic as well  Of note, patient has been seen by the ICU staff in the emergency room and they wish medicine to admit to stepdown          EKG Interpretation    Interpreted by emergency department physician    Rhythm: normal sinus   Rate: normal at 87 bpm  Axis: normal  Conduction: normal  ST Segments: no acute change  T Waves: no acute change  Q Waves: no acute change    Clinical Impression:  nonspecific EKG. Critical Care  None      (Please note that portions of this note were completed with a voice recognition program. Efforts were made to edit the dictations but occasionally words are mis-transcribed.  Whenever words are used in this note in any gender, they shall be construed as though they were used in the gender appropriate to the circumstances; and whenever words are used in this note in the singular or plural form, they shall be construed as though they were used in the form appropriate to the circumstances.)    MD Miguel Mercer  Attending Emergency Medicine Physician              Rustam Cuellar MD  [address], [place]  19 [address]  [address] MD  19 [address] Ey[address] Leblanc MD  19 [address] Ey[address] Leblanc MD  19 [id number]

## 2019-03-22 LAB
ABSOLUTE EOS #: <0.03 K/UL (ref 0–0.44)
ABSOLUTE IMMATURE GRANULOCYTE: 0.07 K/UL (ref 0–0.3)
ABSOLUTE LYMPH #: 2.29 K/UL (ref 1.1–3.7)
ABSOLUTE MONO #: 0.52 K/UL (ref 0.1–1.2)
ALBUMIN SERPL-MCNC: 3.1 G/DL (ref 3.5–5.2)
ALBUMIN/GLOBULIN RATIO: 1 (ref 1–2.5)
ALLEN TEST: ABNORMAL
ALP BLD-CCNC: 38 U/L (ref 35–104)
ALT SERPL-CCNC: 224 U/L (ref 5–33)
ANION GAP SERPL CALCULATED.3IONS-SCNC: 8 MMOL/L (ref 9–17)
AST SERPL-CCNC: 202 U/L
BASOPHILS # BLD: 0 % (ref 0–2)
BASOPHILS ABSOLUTE: <0.03 K/UL (ref 0–0.2)
BILIRUB SERPL-MCNC: 0.24 MG/DL (ref 0.3–1.2)
BILIRUBIN DIRECT: <0.08 MG/DL
BILIRUBIN, INDIRECT: ABNORMAL MG/DL (ref 0–1)
BUN BLDV-MCNC: 15 MG/DL (ref 6–20)
BUN/CREAT BLD: ABNORMAL (ref 9–20)
CALCIUM SERPL-MCNC: 7.4 MG/DL (ref 8.6–10.4)
CARBOXYHEMOGLOBIN: 1.3 % (ref 0–5)
CHLORIDE BLD-SCNC: 106 MMOL/L (ref 98–107)
CO2: 19 MMOL/L (ref 20–31)
CREAT SERPL-MCNC: 1.02 MG/DL (ref 0.5–0.9)
CREATININE URINE: 108.2 MG/DL (ref 28–217)
DIFFERENTIAL TYPE: ABNORMAL
EKG ATRIAL RATE: 87 BPM
EKG P AXIS: 53 DEGREES
EKG P-R INTERVAL: 118 MS
EKG Q-T INTERVAL: 374 MS
EKG QRS DURATION: 80 MS
EKG QTC CALCULATION (BAZETT): 450 MS
EKG R AXIS: 47 DEGREES
EKG T AXIS: 36 DEGREES
EKG VENTRICULAR RATE: 87 BPM
EOSINOPHILS RELATIVE PERCENT: 0 % (ref 1–4)
FIO2: ABNORMAL
GFR AFRICAN AMERICAN: >60 ML/MIN
GFR NON-AFRICAN AMERICAN: 59 ML/MIN
GFR SERPL CREATININE-BSD FRML MDRD: ABNORMAL ML/MIN/{1.73_M2}
GFR SERPL CREATININE-BSD FRML MDRD: ABNORMAL ML/MIN/{1.73_M2}
GLOBULIN: ABNORMAL G/DL (ref 1.5–3.8)
GLUCOSE BLD-MCNC: 98 MG/DL (ref 70–99)
HAV IGM SER IA-ACNC: NONREACTIVE
HCO3 VENOUS: 21.8 MMOL/L (ref 24–30)
HCT VFR BLD CALC: 28.3 % (ref 36.3–47.1)
HEMOGLOBIN: 9 G/DL (ref 11.9–15.1)
HEPATITIS B CORE IGM ANTIBODY: NONREACTIVE
HEPATITIS B SURFACE ANTIGEN: NONREACTIVE
HEPATITIS C ANTIBODY: NONREACTIVE
IMMATURE GRANULOCYTES: 1 %
LYMPHOCYTES # BLD: 15 % (ref 24–43)
MCH RBC QN AUTO: 30.4 PG (ref 25.2–33.5)
MCHC RBC AUTO-ENTMCNC: 31.8 G/DL (ref 28.4–34.8)
MCV RBC AUTO: 95.6 FL (ref 82.6–102.9)
METHEMOGLOBIN: ABNORMAL % (ref 0–1.5)
MODE: ABNORMAL
MONOCYTES # BLD: 3 % (ref 3–12)
NEGATIVE BASE EXCESS, VEN: 3.3 MMOL/L (ref 0–2)
NOTIFICATION TIME: ABNORMAL
NOTIFICATION: ABNORMAL
NRBC AUTOMATED: 0 PER 100 WBC
O2 DEVICE/FLOW/%: ABNORMAL
O2 SAT, VEN: 82.6 % (ref 60–85)
OXYHEMOGLOBIN: ABNORMAL % (ref 95–98)
PATIENT TEMP: 37
PCO2, VEN, TEMP ADJ: ABNORMAL MMHG (ref 39–55)
PCO2, VEN: 42.1 (ref 39–55)
PDW BLD-RTO: 14 % (ref 11.8–14.4)
PEEP/CPAP: ABNORMAL
PH VENOUS: 7.33 (ref 7.32–7.42)
PH, VEN, TEMP ADJ: ABNORMAL (ref 7.32–7.42)
PLATELET # BLD: 205 K/UL (ref 138–453)
PLATELET ESTIMATE: ABNORMAL
PMV BLD AUTO: 9.9 FL (ref 8.1–13.5)
PO2, VEN, TEMP ADJ: ABNORMAL MMHG (ref 30–50)
PO2, VEN: 49.8 (ref 30–50)
POSITIVE BASE EXCESS, VEN: ABNORMAL MMOL/L (ref 0–2)
POTASSIUM SERPL-SCNC: 4 MMOL/L (ref 3.7–5.3)
POTASSIUM SERPL-SCNC: 5.9 MMOL/L (ref 3.7–5.3)
PSV: ABNORMAL
PT. POSITION: ABNORMAL
RBC # BLD: 2.96 M/UL (ref 3.95–5.11)
RBC # BLD: ABNORMAL 10*6/UL
RESPIRATORY RATE: ABNORMAL
SAMPLE SITE: ABNORMAL
SEG NEUTROPHILS: 81 % (ref 36–65)
SEGMENTED NEUTROPHILS ABSOLUTE COUNT: 12.41 K/UL (ref 1.5–8.1)
SET RATE: ABNORMAL
SODIUM BLD-SCNC: 133 MMOL/L (ref 135–144)
SODIUM,UR: 79 MMOL/L
TEXT FOR RESPIRATORY: ABNORMAL
TOTAL CK: 1314 U/L (ref 26–192)
TOTAL CK: 1326 U/L (ref 26–192)
TOTAL HB: ABNORMAL G/DL (ref 12–16)
TOTAL PROTEIN: 6.1 G/DL (ref 6.4–8.3)
TOTAL RATE: ABNORMAL
VT: ABNORMAL
WBC # BLD: 15.3 K/UL (ref 3.5–11.3)
WBC # BLD: ABNORMAL 10*3/UL

## 2019-03-22 PROCEDURE — 80048 BASIC METABOLIC PNL TOTAL CA: CPT

## 2019-03-22 PROCEDURE — 2060000000 HC ICU INTERMEDIATE R&B

## 2019-03-22 PROCEDURE — 80076 HEPATIC FUNCTION PANEL: CPT

## 2019-03-22 PROCEDURE — 85025 COMPLETE CBC W/AUTO DIFF WBC: CPT

## 2019-03-22 PROCEDURE — 6370000000 HC RX 637 (ALT 250 FOR IP): Performed by: STUDENT IN AN ORGANIZED HEALTH CARE EDUCATION/TRAINING PROGRAM

## 2019-03-22 PROCEDURE — 84132 ASSAY OF SERUM POTASSIUM: CPT

## 2019-03-22 PROCEDURE — 6360000002 HC RX W HCPCS: Performed by: HOSPITALIST

## 2019-03-22 PROCEDURE — 6360000002 HC RX W HCPCS: Performed by: STUDENT IN AN ORGANIZED HEALTH CARE EDUCATION/TRAINING PROGRAM

## 2019-03-22 PROCEDURE — 36415 COLL VENOUS BLD VENIPUNCTURE: CPT

## 2019-03-22 PROCEDURE — 82805 BLOOD GASES W/O2 SATURATION: CPT

## 2019-03-22 PROCEDURE — 99223 1ST HOSP IP/OBS HIGH 75: CPT | Performed by: INTERNAL MEDICINE

## 2019-03-22 PROCEDURE — 82550 ASSAY OF CK (CPK): CPT

## 2019-03-22 PROCEDURE — 2580000003 HC RX 258: Performed by: HOSPITALIST

## 2019-03-22 RX ORDER — MAGNESIUM SULFATE 1 G/100ML
1 INJECTION INTRAVENOUS PRN
Status: DISCONTINUED | OUTPATIENT
Start: 2019-03-22 | End: 2019-03-23 | Stop reason: HOSPADM

## 2019-03-22 RX ORDER — SODIUM CHLORIDE 0.9 % (FLUSH) 0.9 %
10 SYRINGE (ML) INJECTION PRN
Status: DISCONTINUED | OUTPATIENT
Start: 2019-03-22 | End: 2019-03-23 | Stop reason: HOSPADM

## 2019-03-22 RX ORDER — POTASSIUM CHLORIDE 7.45 MG/ML
10 INJECTION INTRAVENOUS PRN
Status: DISCONTINUED | OUTPATIENT
Start: 2019-03-22 | End: 2019-03-23 | Stop reason: HOSPADM

## 2019-03-22 RX ORDER — POTASSIUM CHLORIDE 1.5 G/1.77G
40 POWDER, FOR SOLUTION ORAL PRN
Status: DISCONTINUED | OUTPATIENT
Start: 2019-03-22 | End: 2019-03-23 | Stop reason: HOSPADM

## 2019-03-22 RX ORDER — SODIUM CHLORIDE 0.9 % (FLUSH) 0.9 %
10 SYRINGE (ML) INJECTION EVERY 12 HOURS SCHEDULED
Status: DISCONTINUED | OUTPATIENT
Start: 2019-03-22 | End: 2019-03-23 | Stop reason: HOSPADM

## 2019-03-22 RX ORDER — SODIUM CHLORIDE 9 MG/ML
INJECTION, SOLUTION INTRAVENOUS CONTINUOUS
Status: DISCONTINUED | OUTPATIENT
Start: 2019-03-22 | End: 2019-03-23

## 2019-03-22 RX ORDER — ONDANSETRON 2 MG/ML
4 INJECTION INTRAMUSCULAR; INTRAVENOUS EVERY 6 HOURS PRN
Status: DISCONTINUED | OUTPATIENT
Start: 2019-03-22 | End: 2019-03-23 | Stop reason: HOSPADM

## 2019-03-22 RX ORDER — POTASSIUM CHLORIDE 20 MEQ/1
40 TABLET, EXTENDED RELEASE ORAL PRN
Status: DISCONTINUED | OUTPATIENT
Start: 2019-03-22 | End: 2019-03-23 | Stop reason: HOSPADM

## 2019-03-22 RX ADMIN — Medication 10 ML: at 10:29

## 2019-03-22 RX ADMIN — ONDANSETRON 4 MG: 2 INJECTION INTRAMUSCULAR; INTRAVENOUS at 10:17

## 2019-03-22 RX ADMIN — ONDANSETRON 4 MG: 2 INJECTION INTRAMUSCULAR; INTRAVENOUS at 19:13

## 2019-03-22 RX ADMIN — PIPERACILLIN AND TAZOBACTAM 3.38 G: 3; .375 INJECTION, POWDER, LYOPHILIZED, FOR SOLUTION INTRAVENOUS; PARENTERAL at 01:32

## 2019-03-22 RX ADMIN — PIPERACILLIN AND TAZOBACTAM 3.38 G: 3; .375 INJECTION, POWDER, LYOPHILIZED, FOR SOLUTION INTRAVENOUS; PARENTERAL at 17:49

## 2019-03-22 RX ADMIN — PIPERACILLIN AND TAZOBACTAM 3.38 G: 3; .375 INJECTION, POWDER, LYOPHILIZED, FOR SOLUTION INTRAVENOUS; PARENTERAL at 10:17

## 2019-03-22 RX ADMIN — Medication 10 ML: at 20:02

## 2019-03-22 RX ADMIN — ACETAMINOPHEN 650 MG: 325 TABLET ORAL at 06:21

## 2019-03-22 RX ADMIN — SODIUM CHLORIDE: 9 INJECTION, SOLUTION INTRAVENOUS at 01:31

## 2019-03-22 RX ADMIN — ENOXAPARIN SODIUM 40 MG: 40 INJECTION SUBCUTANEOUS at 13:29

## 2019-03-22 ASSESSMENT — PAIN SCALES - GENERAL
PAINLEVEL_OUTOF10: 10
PAINLEVEL_OUTOF10: 0

## 2019-03-22 NOTE — PLAN OF CARE
Patient free of falls throughout the shift. Fall precautions in place, hourly rounding done, bed alarm on, and patient uses call light appropriately prior to getting out of bed. Will continue to monitor.  Robina El RN

## 2019-03-22 NOTE — ED PROVIDER NOTES
Dr Claribel Steen sign out, fatigue   Pneumonia, cocaine, polysubstance use, pt admitted,       Moises Newman,   03/22/19 0591

## 2019-03-22 NOTE — ED PROVIDER NOTES
S/o from Dr. Thom Torres    38 yo with bilat PNA, hypoxia, stable on O2 andABX, admit to stepdown     Shaan Naidu MD  03/21/19 0698

## 2019-03-22 NOTE — ED PROVIDER NOTES
[facility] ED  Emergency Department  Emergency Medicine Resident Sign-out     Care of Alexandra Bumps was assumed from Dr. Emani Zuluaga and is being seen for Fatigue (Pt reports hanging out with friend last night, drank some beers and smoked some marijuana and indulged cocaine. Pt reports cocaine may have been laced with something because she has \"never felt this way. \" No c/o chest pain. )  . The patient's initial evaluation and plan have been discussed with the prior provider who initially evaluated the patient.      EMERGENCY DEPARTMENT COURSE / MEDICAL DECISION MAKING:       MEDICATIONS GIVEN:  Orders Placed This Encounter   Medications    0.9 % sodium chloride bolus    ioversol (OPTIRAY) 74 % injection 75 mL    cefTRIAXone (ROCEPHIN) 1 g IVPB in 50 mL D5W minibag    azithromycin (ZITHROMAX) tablet 500 mg    naloxone (NARCAN) 2 MG/2ML injection     ALMA BLACKMON: cabinet override    ondansetron (ZOFRAN) 4 MG/2ML injection     ALMA BLACKMON: cabinet override    ondansetron (ZOFRAN) injection 4 mg    naloxone (NARCAN) injection 0.4 mg    naloxone (NARCAN) injection 0.4 mg    ondansetron (ZOFRAN) injection 4 mg    acetaminophen (TYLENOL) tablet 650 mg    enoxaparin (LOVENOX) injection 40 mg    DISCONTD: ondansetron (ZOFRAN) injection 4 mg    DISCONTD: 0.9 % sodium chloride infusion    0.9 % sodium chloride infusion    0.9 % sodium chloride infusion    ondansetron (ZOFRAN) injection 4 mg    DISCONTD: ampicillin-sulbactam (UNASYN) 1.5 g IVPB minibag    DISCONTD: piperacillin-tazobactam (ZOSYN) 3.375 g in dextrose 50 mL IVPB extended infusion (premix)    piperacillin-tazobactam (ZOSYN) 3.375 g in dextrose 5 % 50 mL IVPB (mini-bag)       LABS / RADIOLOGY:     Labs Reviewed   CBC WITH AUTO DIFFERENTIAL - Abnormal; Notable for the following components:       Result Value    WBC 14.9 (*)     Seg Neutrophils 91 (*)     Lymphocytes 2 (*)     Eosinophils % 0 (*)     Segs Absolute 13.56 (*)     Absolute Lymph # 0.30 (*)     Absolute Mono # 1.04 (*)     All other components within normal limits   BASIC METABOLIC PANEL - Abnormal; Notable for the following components:    Glucose 108 (*)     CREATININE 1.72 (*)     GFR Non- 32 (*)     GFR  39 (*)     All other components within normal limits   TROPONIN - Abnormal; Notable for the following components:    Troponin, High Sensitivity 51 (*)     All other components within normal limits   HEPATIC FUNCTION PANEL - Abnormal; Notable for the following components:     (*)      (*)     Total Bilirubin <0.10 (*)     All other components within normal limits   PHOSPHORUS - Abnormal; Notable for the following components:    Phosphorus 5.8 (*)     All other components within normal limits   URINE RT REFLEX TO CULTURE - Abnormal; Notable for the following components:    Turbidity UA CLOUDY (*)     Urine Hgb LARGE (*)     Protein, UA 2+ (*)     All other components within normal limits   TOX SCR, BLD, ED - Abnormal; Notable for the following components:    Salicylate Lvl <1 (*)     Acetaminophen Level <5 (*)     All other components within normal limits   TROPONIN - Abnormal; Notable for the following components:    Troponin, High Sensitivity 42 (*)     All other components within normal limits   SODIUM (POC) - Abnormal; Notable for the following components:    POC Sodium 137 (*)     All other components within normal limits   CALCIUM, IONIC (POC) - Abnormal; Notable for the following components:    POC Ionized Calcium 1.07 (*)     All other components within normal limits   SODIUM (POC) - Abnormal; Notable for the following components:    POC Sodium 137 (*)     All other components within normal limits   CALCIUM, IONIC (POC) - Abnormal; Notable for the following components:    POC Ionized Calcium 1.11 (*)     All other components within normal limits   MICROSCOPIC URINALYSIS - Abnormal; Notable for the following components:    Bacteria, UA FEW (*)     All other components within normal limits   URINE DRUG SCREEN - Abnormal; Notable for the following components:    Cocaine Metabolite, Urine POSITIVE (*)     Cannabinoid Scrn, Ur POSITIVE (*)     All other components within normal limits   HGB/HCT - Abnormal; Notable for the following components:    POC Hemoglobin 9.1 (*)     POC Hematocrit 27 (*)     All other components within normal limits   CHLORIDE (POC) - Abnormal; Notable for the following components:    POC Chloride 116 (*)     All other components within normal limits   CALCIUM, IONIC (POC) - Abnormal; Notable for the following components:    POC Ionized Calcium 0.90 (*)     All other components within normal limits   CK - Abnormal; Notable for the following components: Total  (*)     All other components within normal limits   MYOGLOBIN, SERUM - Abnormal; Notable for the following components:    Myoglobin 802 (*)     All other components within normal limits   CK - Abnormal; Notable for the following components:     Total CK 1,326 (*)     All other components within normal limits   CBC WITH AUTO DIFFERENTIAL - Abnormal; Notable for the following components:    WBC 15.3 (*)     RBC 2.96 (*)     Hemoglobin 9.0 (*)     Hematocrit 28.3 (*)     Seg Neutrophils 81 (*)     Lymphocytes 15 (*)     Eosinophils % 0 (*)     Immature Granulocytes 1 (*)     Segs Absolute 12.41 (*)     All other components within normal limits   BLOOD GAS, VENOUS - Abnormal; Notable for the following components:    HCO3, Venous 21.8 (*)     Negative Base Excess, Henri 3.3 (*)     All other components within normal limits   VENOUS BLOOD GAS, POINT OF CARE - Abnormal; Notable for the following components:    pH, Henri 7.220 (*)     pCO2, Henri 57.5 (*)     pO2, Henri 23.4 (*)     Negative Base Excess, Herni 5 (*)     O2 Sat, Henri 30 (*)     All other components within normal limits   CREATININE W/GFR POINT OF CARE - Abnormal; Notable for the following components:    POC Creatinine 1.46 (*)     GFR Comment 47 (*)     GFR Non- 39 (*)     All other components within normal limits   LACTIC ACID,POINT OF CARE - Abnormal; Notable for the following components:    POC Lactic Acid 5.06 (*)     All other components within normal limits   POCT GLUCOSE - Abnormal; Notable for the following components:    POC Glucose 150 (*)     All other components within normal limits   VENOUS BLOOD GAS, POINT OF CARE - Abnormal; Notable for the following components:    pH, Henri 7.164 (*)     pCO2, Henri 68.9 (*)     Negative Base Excess, Henri 5 (*)     O2 Sat, Henri 42 (*)     All other components within normal limits   CREATININE W/GFR POINT OF CARE - Abnormal; Notable for the following components:    POC Creatinine 1.63 (*)     GFR Comment 42 (*)     GFR Non- 34 (*)     All other components within normal limits   LACTIC ACID,POINT OF CARE - Abnormal; Notable for the following components:    POC Lactic Acid 2.51 (*)     All other components within normal limits   VENOUS BLOOD GAS, POINT OF CARE - Abnormal; Notable for the following components:    pH, Henri 7.269 (*)     pCO2, Henri 39.9 (*)     HCO3, Venous 18.3 (*)     Total CO2, Venous 20 (*)     Negative Base Excess, Henri 8 (*)     O2 Sat, Henri 53 (*)     All other components within normal limits   CULTURE BLOOD #1   CULTURE BLOOD #1   PROTIME-INR   APTT   BRAIN NATRIURETIC PEPTIDE   LIPASE   HCG, SERUM, QUALITATIVE   MAGNESIUM   HGB/HCT   POTASSIUM (POC)   CHLORIDE (POC)   HGB/HCT   POTASSIUM (POC)   CHLORIDE (POC)   SODIUM (POC)   POTASSIUM (POC)   HEPATITIS PANEL, ACUTE   BASIC METABOLIC PANEL W/ REFLEX TO MG FOR LOW K   HEPATIC FUNCTION PANEL   CK   SODIUM, URINE, RANDOM   CREATININE, RANDOM URINE   POC BLOOD GAS AND CHEMISTRY   POC G4: LACTIC ACID,BLOOD GAS INCLUDES CALC.  BASE EXCESS, SO2   ANION GAP (CALC) POC   POCT GLUCOSE   ANION GAP (CALC) POC   CREATININE W/GFR POINT OF CARE   LACTIC ACID,POINT OF CARE POCT GLUCOSE   ANION GAP (CALC) POC       Ct Head Wo Contrast    Result Date: 3/21/2019  EXAMINATION: CT OF THE HEAD WITHOUT CONTRAST  3/21/2019 10:54 am TECHNIQUE: CT of the head was performed without the administration of intravenous contrast. Dose modulation, iterative reconstruction, and/or weight based adjustment of the mA/kV was utilized to reduce the radiation dose to as low as reasonably achievable. COMPARISON: None. HISTORY: ORDERING SYSTEM PROVIDED HISTORY: Patient with traumatic head injury TECHNOLOGIST PROVIDED HISTORY: FINDINGS: BRAIN/VENTRICLES: There is no acute intracranial hemorrhage, mass effect or midline shift. No abnormal extra-axial fluid collection. The gray-white differentiation is maintained without evidence of an acute infarct. There is no evidence of hydrocephalus. ORBITS: The visualized portion of the orbits demonstrate no acute abnormality. SINUSES: The visualized paranasal sinuses and mastoid air cells demonstrate no acute abnormality. Mild mucoperiosteal thickening along the walls of the maxillary sinuses and ethmoid air cells. SOFT TISSUES/SKULL:  No acute abnormality of the visualized skull or soft tissues. No acute intracranial abnormality. Xr Chest Portable    Result Date: 3/21/2019  EXAMINATION: SINGLE XRAY VIEW OF THE CHEST 3/21/2019 10:48 am COMPARISON: February 19, 2012 HISTORY: ORDERING SYSTEM PROVIDED HISTORY: Shortness of breath and fatigue with hypoxia TECHNOLOGIST PROVIDED HISTORY: Shortness of breath and fatigue with hypoxia FINDINGS: The cardiomediastinal silhouette is normal in size. There is patchy airspace disease involving the right lower lobe consistent with pneumonia. No pleural effusion or pneumothorax is present. Acute right lower lobe pneumonia. Radiographic follow-up recommended to ensure resolution.      Ct Chest Pulmonary Embolism W Contrast    Result Date: 3/21/2019  EXAMINATION: CTA OF THE CHEST 3/21/2019 10:54 am TECHNIQUE: CTA of the should also be considered. RECENT VITALS:     Temp: 97.4 °F (36.3 °C),  Pulse: 89, Resp: 17, BP: 119/75, SpO2: 97 %    This patient is a 39 y.o. Female with altered mental status and polysubstance abuse. Pt responded to Narcan. Incidental finding of pneumonia, possibly 2/2 aspiration. Also has traumatic rhabdomyolysis and hypoxia. CT was neg for PE. Pt received abx. 14 [facility] neg. Patient admitted to stepdown. OUTSTANDING TASKS / RECOMMENDATIONS:    1. Awaiting bed     FINAL IMPRESSION:     1. Pneumonia due to organism    2. Hypoxia    3. Traumatic rhabdomyolysis, initial encounter ([facility] [address]    4. Polysubstance abuse ([facility]        DISPOSITION:         DISPOSITION:  []  Discharge   []  Transfer -    [x]  Admission -  Medicine   []  Against Medical Advice   []  Eloped   FOLLOW-UP: No follow-up provider specified.    DISCHARGE MEDICATIONS: New Prescriptions    No medications on file           Shayna Le MD  Emergency Medicine Resident  [address]        Shayna Le MD  Resident  03/22/19 8403

## 2019-03-22 NOTE — ED NOTES
Bedside report received from [facility]. Pt meal tray arrived. Pt to be provided with hospital bed shortly. Family at bedside, questions about treatment and POC answered. Denies further needs. Call light available.         Carissa Avila RN  03/21/19 1924
Dr. Tanner Franco at bedside for re-assessment.        Gely Clancy, RN  03/21/19 1312
EPOC ran. Pt resting on cart, respirations are equal and nonlabored, no distress noted. Pt remains alert and oriented. Pt updated on poc and duration, continue to monitor.       Christos Weston RN  03/21/19 8724
Fluid bolus increased to 150 ml/hr. Pt updated on bed placement. Awaiting repeat chest x ray then to the floor.       Vee Hickey RN  03/22/19 1917
[facility] running      Davida Key RN  03/21/19 5699
Pt 92% on room air, placed on 2L nasal cannula. . Pt on cardiac monitor, pulse ox and bp cuff.       Tee Harvey RN  03/21/19 1037
Pt alert in bed, eating. Pt denies needs. Pt has call light available. Will continue to monitor.       Taina Thomson RN  03/21/19 4017
Pt asleep on cart at this time. Remains on monitor. Pt to be admitted to ICU.       Charlotte Garza RN  03/21/19 7586
Pt asleep, respirations non labored. Family remains at bedside.       Debi Quiros RN  03/21/19 3652
Pt assisted onto bedside commode and tolerated well. Pt back in bed talking on telephone. Pt offered a hospital bed instead of stretcher and declined at this time. Pt updated on poc and duration, awaiting bed for admission, duration unknown.       Stevenson Rasmussen RN  03/21/19 0887
Pt assisted to bedside commode. Dr. Kieran Zhang updated on patients response to narcan.       James Hook RN  03/21/19 2145
Pt back from CT.       River Boland RN  03/21/19 1007
Pt feeling like she is going to vomit, pt given 4mg of IV zofran.       Rachel Trammell RN  03/21/19 0009
Pt given 0.5mg of narcan IV per verbal order from Dr. Justin Real, pt now sitting up in bed, feeling nauseous.       Debi Quiros RN  03/21/19 4824
Pt resting on cart with eyes closed, pt awakens easily to verbal stimuli and is then alert and oriented answering writers questions. Per Dr Ambrose Cisneros ok to hold narcan at this time.       Susannah Guaman RN  03/21/19 8818
Pt resting on cart, respirations are equal and nonlabored, no distress noted.  Pt updated on poc and duration, continue to monitor, awaiting bed for admission     Rojas Oconnor RN  03/21/19 0177
Pt resting on cart, respirations are equal and nonlabored, no distress noted. Pt informed dinner tray has been ordered for her. Pt denies any needs. Pt updated on poc and duration, continue to monitor, awaiting bed for admission, duration unknown.       Terrance Damico RN  03/21/19 1913
Pt resting on cart, respirations are equal and nonlabored, no distress noted. Pt remains alert and oriented x 4 at this time. Pt updated on poc and duration, continue to monitor.       Nevaeh Enriquez RN  03/21/19 3940
Pt resting on cart, respirations are equal and nonlabored, no distress noted. Pt updated on poc and duration, continue to monitor, awaiting floor for report. Meal tray ordered for floor.       Stevenson Rasmussen RN  03/22/19 9061
Pt resting on cart, respirations are equal and nonlabored, no distress noted. Pt updated on poc and duration, continue to monitor.       Maryjo Lindsey RN  03/22/19 3011
Pt resting on cart. Respirations non labored. Pt to be admitted. Family remains at bedside and pt remains on monitor. Resident at bedside to re-evaluate patient.       Sandi Vegas RN  03/21/19 3583
Pt still remains drowsy, sleepy. Pt arouses to voice.       Angie Espinal RN  03/21/19 6056
Pt to ct by stretcher.       Ladi Mcleod RN  03/21/19 4138
Pt transferred into hospital bed. Tolerated well, fresh linens provided. Side table, call light available. Requesting anti-emetics, will inquire with physician.        Cosme Hunter, RN  03/21/19 1936
Report from [employer]. Pt resting on cart, respirations are equal and nonlabored, no distress noted. Pt updated on poc and duration, continue to monitor, awaiting floor floor admission.       Shan Hunter RN  03/22/19 1034
Report to [employer] .       Dufm Lab, RN  03/22/19 4529
pts urine sample collected, labeled and sent to lab.       Jessie Eagle RN  03/21/19 9321
note that portions of this note were completed with a voice recognition program.  Efforts were made to edit the dictations but occasionally words are mis-transcribed.)    MD Macario Austin Figures  Attending Emergency Medicine Physician       Estefany Yusuf MD  03/22/19 6494

## 2019-03-22 NOTE — PROGRESS NOTES
[address] [facility]    PROGRESS NOTE             Date:   3/22/2019  Patient name:  Quinton Lora  Date of admission:  3/21/2019  9:53 AM  MRN:   [id number]  YOB: 1975    CHIEF COMPLAINT     Chief Complaint   Patient presents with    Fatigue     Pt reports hanging out with friend last night, drank some beers and smoked some marijuana and indulged cocaine. Pt reports cocaine may have been laced with something because she has \"never felt this way. \" No c/o chest pain. HISTORY OF PRESENT ILLNESS      The patient is a 39 y.o.  female who is admitted to the hospital for polysubstance abuse. Patient reports hanging out with friends last night and smoking some marijuana and cocaine with few beers. Patient reports she felt very weak and tired after that. Patient states she was in the bed all night and  her mom woke her up this morning and decided to bring her to hospital because of generalized fatigue/weakness. She also reports associated nausea and vomiting. Patient believed that her cocaine was laced with some other substance like fentanyl. She reports she occasionally does cocaine but she never felt like this. Patient denies fever, chills, headache, chest pain, shortness of breath, belly pain, changes in urinary or bowel habits, focal neurological deficit. On arrival to ED patient was arousalable to stimuli and alert and oriented ×4   but very somnolent. She was also hypoxic. She was initially given 0.5 mg of Narcan which improved her mentation and vomiting. Urine drug screen did not show opiates but is positive for cocaine and marijuana. Blood tox also negative for ethanol, Tylenol and salicylate. CTA chest was done that showed bilateral infiltrates likely aspiration pneumonia. CK and myoglobin are also elevated likely because of nontraumatic rhabdomyolysis.     She also had MARY and  elevated liver enzymes likely because of rhabdomyolysis     CURRENT EVALUATION :3/22/2019  Patient states she is feeling stronger but still not back her baseline and feel tired. She is ambulating  VS stable, Afebrile  LFT's and Creatinine improving    Intake/Output Summary (Last 24 hours) at 3/22/2019 0839  Last data filed at 3/22/2019 0531  Gross per 24 hour   Intake 1050 ml   Output --   Net 1050 ml     Patient Vitals for the past 96 hrs (Last 3 readings):   Weight   03/21/19 1005 200 lb (90.7 kg)       PAST MEDICAL HISTORY       has a past medical history of PTSD (post-traumatic stress disorder). PAST SURGICAL HISTORY       has a past surgical history that includes Rotator cuff repair (2003). HOME MEDICATIONS        Prior to Admission medications    Not on File       ALLERGIES      Darvocet a500 [propoxyphene n-acetaminophen]    SOCIAL HISTORY            FAMILY HISTORY      family history includes Cancer in her father; Diabetes in her maternal grandfather; Hypertension in her mother. REVIEW OF SYSTEMS      · Constitutional: Positive for generalized body weakness and fatigue. · Eyes: Negative for visual changes, diplopia  · ENT: Negative for mouth sores, sore throat. · Cardiovascular: Negative for lightheadedness ,chest pain, palpitations   · Respiratory:Negative for Shortness of breath,cough or wheezing. · Gastrointestinal: Negative for nausea/vomiting, change in bowel habits, abdominal pain  · Genitourinary:Negative for change in bladder habits, dysuria, trouble voiding, hematuria.   · Musculoskeletal: Negative for joint pain   · Neurological: Negative for headache, dizziness, change in muscle strength, numbness/tingling  · Psychiatric: negative for change in mood, affect    PHYSICAL EXAM      /75   Pulse 83   Temp 97.4 °F (36.3 °C) (Oral)   Resp 16   Ht 5' 8\" (1.727 m)   Wt 200 lb (90.7 kg)   SpO2 100%   BMI 30.41 kg/m²      · General appearance: well nourished  · HEENT: Head: Normocephalic, no lesions, without

## 2019-03-22 NOTE — H&P
[address] [facility] [address]    HISTORY AND PHYSICAL EXAMINATION            Date:   3/22/2019  Patient name:  Shameka King  Date of admission:  3/21/2019  9:53 AM  MRN:   [id number]  YOB: 1975    CHIEF COMPLAINT     Chief Complaint   Patient presents with    Fatigue     Pt reports hanging out with friend last night, drank some beers and smoked some marijuana and indulged cocaine. Pt reports cocaine may have been laced with something because she has \"never felt this way. \" No c/o chest pain. HISTORY OF PRESENT ILLNESS      The patient is a 39 y.o.  female who is admitted to the hospital for polysubstance abuse. Patient reports hanging out with friends last night and smoking some marijuana and cocaine with few beers. Patient reports she felt very weak and tired after that. Patient states she was in the bed all night and  her mom woke her up this morning and decided to bring her to hospital because of generalized fatigue/weakness. She also reports associated nausea and vomiting. Patient believed that her cocaine was laced with some other substance like fentanyl. She reports she occasionally does cocaine but she never felt like this. Patient denies fever, chills, headache, chest pain, shortness of breath, belly pain, changes in urinary or bowel habits, focal neurological deficit. On arrival to ED patient was arousalable to stimuli and alert and oriented ×4   but very somnolent. She was also hypoxic. She was initially given 0.5 mg of Narcan which improved her mentation and vomiting. Urine drug screen did not show opiates but is positive for cocaine and marijuana. Blood tox also negative for ethanol, Tylenol and salicylate. CTA chest was done that showed bilateral infiltrates likely aspiration pneumonia. CK and myoglobin are also elevated likely because of nontraumatic rhabdomyolysis.     She also had MARY and  elevated liver enzymes likely because of rhabdomyolysis         PAST MEDICAL HISTORY       has a past medical history of PTSD (post-traumatic stress disorder). PAST SURGICAL HISTORY       has a past surgical history that includes Rotator cuff repair (2003). HOME MEDICATIONS        Prior to Admission medications    Not on File       ALLERGIES      Darvocet a500 [propoxyphene n-acetaminophen]    SOCIAL HISTORY            FAMILY HISTORY      family history includes Cancer in her father; Diabetes in her maternal grandfather; Hypertension in her mother. REVIEW OF SYSTEMS      · Constitutional: Positive for generalized body weakness and fatigue. · Eyes: Negative for visual changes, diplopia  · ENT: Negative for mouth sores, sore throat. · Cardiovascular: Negative for lightheadedness ,chest pain, palpitations   · Respiratory:Negative for Shortness of breath,cough or wheezing. · Gastrointestinal: Positive for nausea and vomiting   · Genitourinary:Negative for change in bladder habits, dysuria, hematuria. · Musculoskeletal: Negative for joint pain   · Neurological: Negative for headache, dizziness, change in muscle strength numbness/tingling  · Psychiatric: negative for change in mood, affect    PHYSICAL EXAM      /75   Pulse 98   Temp 97.4 °F (36.3 °C) (Oral)   Resp 15   Ht 5' 8\" (1.727 m)   Wt 200 lb (90.7 kg)   SpO2 97%   BMI 30.41 kg/m²      · General appearance: awake, alert, cooperative  · HEENT: Head: Normocephalic, no lesions, without obvious abnormality. · Lungs: clear to auscultation bilaterally  · Heart: regular rate and rhythm, S1, S2 normal, no murmur  · Abdomen: soft, non-tender; bowel sounds normal; no masses,  no organomegaly  · Extremities: extremities normal, atraumatic, no cyanosis or edema  · Neurological:  Awake, alert, oriented to name, place and time. Cranial nerves II-XII are grossly intact. Reflexes normal and symmetric.  Sensation grossly normal  · Eye no icterus no redness pneumonia. · Continue IV fluid at 200 mL/hour. Monitor CK tomorrow. · Follow LFTs, acute hepatitis panel. · Monitor creatinine. Follow urine sodium and urine creatinine. If creatinine doesn't improve, we will do complete MARY workup. · Check venous blood gas to follow pH and PCO2. · DVT prophylaxis: Lovenox  · Diet: General  · PT/OT evaluation  · Zofran for nausea/vomiting.       Vane Medina MD  PGY-3, Internal medicine resident  [facility], [place], [place]

## 2019-03-22 NOTE — CARE COORDINATION
Case Management Initial Discharge Plan  Luz Gentile,             Met with:patient to discuss discharge plans. Information verified: address, contacts, phone number, , insurance Yes  PCP: No primary care provider on file. Date of last visit: she states that she will find her own PCP    Insurance Provider: Pending Medicaid    Discharge Planning    Living Arrangements:  Children   Support Systems:  Parent, Children    Home has 1 stories  4-6 stairs to climb to get into front door, 0stairs to climb to reach second floor  Location of bedroom/bathroom in home 1st fkoor    Patient able to perform ADL's:Independent    Current Services (outpatient & in home)   DME equipment:   DME provider:     Pharmacy: [facility]   Potential Assistance Purchasing Medications:  No  Does patient want to participate in local refill/ meds to beds program?  No    Potential Assistance Needed:       Patient agreeable to home care: No  [place] of choice provided:  n/a    Prior SNF/Rehab Placement and Facility:   Agreeable to SNF/Rehab: No  [place] of choice provided: n/a   Evaluation: yes    Expected Discharge date:     Patient expects to be discharged to:  Home independent  Follow Up Appointment: Best Day/ Time:      Transportation provider: Family  Transportation arrangements needed for discharge: No    Readmission Risk              Risk of Unplanned Readmission:        18             Does patient have a readmission risk score greater than 14?: Yes  If yes, follow-up appointment must be made within 7 days of discharge. Discharge Plan: Home independent, will make own PCP appt.  JUDSON consult for poly substance abuse, following from a far          Electronically signed by Vianca Ozuna RN on 3/22/19 at 3:05 PM

## 2019-03-23 ENCOUNTER — TELEPHONE (OUTPATIENT)
Dept: INTERNAL MEDICINE CLINIC | Age: 44
End: 2019-03-23

## 2019-03-23 ENCOUNTER — APPOINTMENT (OUTPATIENT)
Dept: GENERAL RADIOLOGY | Age: 44
DRG: 178 | End: 2019-03-23

## 2019-03-23 VITALS
DIASTOLIC BLOOD PRESSURE: 94 MMHG | WEIGHT: 220.9 LBS | OXYGEN SATURATION: 98 % | RESPIRATION RATE: 16 BRPM | HEIGHT: 68 IN | HEART RATE: 84 BPM | BODY MASS INDEX: 33.48 KG/M2 | TEMPERATURE: 98.7 F | SYSTOLIC BLOOD PRESSURE: 151 MMHG

## 2019-03-23 LAB
ABSOLUTE EOS #: <0.03 K/UL (ref 0–0.44)
ABSOLUTE IMMATURE GRANULOCYTE: 0.05 K/UL (ref 0–0.3)
ABSOLUTE LYMPH #: 2.01 K/UL (ref 1.1–3.7)
ABSOLUTE MONO #: 0.51 K/UL (ref 0.1–1.2)
ALBUMIN SERPL-MCNC: 3.4 G/DL (ref 3.5–5.2)
ALBUMIN/GLOBULIN RATIO: 1.2 (ref 1–2.5)
ALP BLD-CCNC: 48 U/L (ref 35–104)
ALT SERPL-CCNC: 163 U/L (ref 5–33)
ANION GAP SERPL CALCULATED.3IONS-SCNC: 11 MMOL/L (ref 9–17)
AST SERPL-CCNC: 81 U/L
BASOPHILS # BLD: 0 % (ref 0–2)
BASOPHILS ABSOLUTE: 0.03 K/UL (ref 0–0.2)
BILIRUB SERPL-MCNC: 0.34 MG/DL (ref 0.3–1.2)
BILIRUBIN DIRECT: 0.12 MG/DL
BILIRUBIN, INDIRECT: 0.22 MG/DL (ref 0–1)
BUN BLDV-MCNC: 13 MG/DL (ref 6–20)
BUN/CREAT BLD: ABNORMAL (ref 9–20)
CALCIUM SERPL-MCNC: 8.2 MG/DL (ref 8.6–10.4)
CHLORIDE BLD-SCNC: 104 MMOL/L (ref 98–107)
CO2: 20 MMOL/L (ref 20–31)
CREAT SERPL-MCNC: 1.04 MG/DL (ref 0.5–0.9)
DIFFERENTIAL TYPE: ABNORMAL
EOSINOPHILS RELATIVE PERCENT: 0 % (ref 1–4)
GFR AFRICAN AMERICAN: >60 ML/MIN
GFR NON-AFRICAN AMERICAN: 58 ML/MIN
GFR SERPL CREATININE-BSD FRML MDRD: ABNORMAL ML/MIN/{1.73_M2}
GFR SERPL CREATININE-BSD FRML MDRD: ABNORMAL ML/MIN/{1.73_M2}
GLOBULIN: ABNORMAL G/DL (ref 1.5–3.8)
GLUCOSE BLD-MCNC: 87 MG/DL (ref 70–99)
HCT VFR BLD CALC: 29 % (ref 36.3–47.1)
HEMOGLOBIN: 9.2 G/DL (ref 11.9–15.1)
IMMATURE GRANULOCYTES: 0 %
LYMPHOCYTES # BLD: 17 % (ref 24–43)
MCH RBC QN AUTO: 29.8 PG (ref 25.2–33.5)
MCHC RBC AUTO-ENTMCNC: 31.7 G/DL (ref 28.4–34.8)
MCV RBC AUTO: 93.9 FL (ref 82.6–102.9)
MONOCYTES # BLD: 4 % (ref 3–12)
NRBC AUTOMATED: 0 PER 100 WBC
PDW BLD-RTO: 13.7 % (ref 11.8–14.4)
PLATELET # BLD: 205 K/UL (ref 138–453)
PLATELET ESTIMATE: ABNORMAL
PMV BLD AUTO: 9.9 FL (ref 8.1–13.5)
POTASSIUM SERPL-SCNC: 4.3 MMOL/L (ref 3.7–5.3)
RBC # BLD: 3.09 M/UL (ref 3.95–5.11)
RBC # BLD: ABNORMAL 10*6/UL
SEG NEUTROPHILS: 79 % (ref 36–65)
SEGMENTED NEUTROPHILS ABSOLUTE COUNT: 8.98 K/UL (ref 1.5–8.1)
SODIUM BLD-SCNC: 135 MMOL/L (ref 135–144)
TOTAL CK: 435 U/L (ref 26–192)
TOTAL PROTEIN: 6.3 G/DL (ref 6.4–8.3)
WBC # BLD: 11.6 K/UL (ref 3.5–11.3)
WBC # BLD: ABNORMAL 10*3/UL

## 2019-03-23 PROCEDURE — 80048 BASIC METABOLIC PNL TOTAL CA: CPT

## 2019-03-23 PROCEDURE — 6360000002 HC RX W HCPCS: Performed by: HOSPITALIST

## 2019-03-23 PROCEDURE — 2580000003 HC RX 258: Performed by: HOSPITALIST

## 2019-03-23 PROCEDURE — 99239 HOSP IP/OBS DSCHRG MGMT >30: CPT | Performed by: INTERNAL MEDICINE

## 2019-03-23 PROCEDURE — 85025 COMPLETE CBC W/AUTO DIFF WBC: CPT

## 2019-03-23 PROCEDURE — 6370000000 HC RX 637 (ALT 250 FOR IP): Performed by: HOSPITALIST

## 2019-03-23 PROCEDURE — 71046 X-RAY EXAM CHEST 2 VIEWS: CPT

## 2019-03-23 PROCEDURE — 6360000002 HC RX W HCPCS: Performed by: STUDENT IN AN ORGANIZED HEALTH CARE EDUCATION/TRAINING PROGRAM

## 2019-03-23 PROCEDURE — C9113 INJ PANTOPRAZOLE SODIUM, VIA: HCPCS | Performed by: HOSPITALIST

## 2019-03-23 PROCEDURE — 82550 ASSAY OF CK (CPK): CPT

## 2019-03-23 PROCEDURE — 80076 HEPATIC FUNCTION PANEL: CPT

## 2019-03-23 PROCEDURE — 36415 COLL VENOUS BLD VENIPUNCTURE: CPT

## 2019-03-23 RX ORDER — PANTOPRAZOLE SODIUM 40 MG/1
40 TABLET, DELAYED RELEASE ORAL
Status: DISCONTINUED | OUTPATIENT
Start: 2019-03-24 | End: 2019-03-23 | Stop reason: HOSPADM

## 2019-03-23 RX ORDER — AMOXICILLIN AND CLAVULANATE POTASSIUM 875; 125 MG/1; MG/1
1 TABLET, FILM COATED ORAL EVERY 12 HOURS SCHEDULED
Status: DISCONTINUED | OUTPATIENT
Start: 2019-03-23 | End: 2019-03-23 | Stop reason: HOSPADM

## 2019-03-23 RX ORDER — PANTOPRAZOLE SODIUM 40 MG/10ML
40 INJECTION, POWDER, LYOPHILIZED, FOR SOLUTION INTRAVENOUS ONCE
Status: DISCONTINUED | OUTPATIENT
Start: 2019-03-23 | End: 2019-03-23

## 2019-03-23 RX ORDER — 0.9 % SODIUM CHLORIDE 0.9 %
10 VIAL (ML) INJECTION DAILY
Status: DISCONTINUED | OUTPATIENT
Start: 2019-03-23 | End: 2019-03-23 | Stop reason: HOSPADM

## 2019-03-23 RX ORDER — AMOXICILLIN AND CLAVULANATE POTASSIUM 875; 125 MG/1; MG/1
1 TABLET, FILM COATED ORAL 2 TIMES DAILY
Qty: 14 TABLET | Refills: 0 | Status: SHIPPED | OUTPATIENT
Start: 2019-03-23 | End: 2019-03-30

## 2019-03-23 RX ORDER — PANTOPRAZOLE SODIUM 20 MG/1
20 TABLET, DELAYED RELEASE ORAL DAILY
Qty: 7 TABLET | Refills: 0 | Status: SHIPPED | OUTPATIENT
Start: 2019-03-23 | End: 2020-11-07

## 2019-03-23 RX ORDER — ONDANSETRON 4 MG/1
4 TABLET, FILM COATED ORAL DAILY PRN
Qty: 30 TABLET | Refills: 0 | Status: SHIPPED | OUTPATIENT
Start: 2019-03-23 | End: 2020-11-07

## 2019-03-23 RX ADMIN — AMOXICILLIN AND CLAVULANATE POTASSIUM 1 TABLET: 875; 125 TABLET, FILM COATED ORAL at 12:43

## 2019-03-23 RX ADMIN — PIPERACILLIN AND TAZOBACTAM 3.38 G: 3; .375 INJECTION, POWDER, LYOPHILIZED, FOR SOLUTION INTRAVENOUS; PARENTERAL at 09:05

## 2019-03-23 RX ADMIN — PANTOPRAZOLE SODIUM 40 MG: 40 INJECTION, POWDER, FOR SOLUTION INTRAVENOUS at 12:43

## 2019-03-23 RX ADMIN — PIPERACILLIN AND TAZOBACTAM 3.38 G: 3; .375 INJECTION, POWDER, LYOPHILIZED, FOR SOLUTION INTRAVENOUS; PARENTERAL at 02:04

## 2019-03-23 RX ADMIN — ONDANSETRON 4 MG: 2 INJECTION INTRAMUSCULAR; INTRAVENOUS at 09:06

## 2019-03-23 RX ADMIN — ENOXAPARIN SODIUM 40 MG: 40 INJECTION SUBCUTANEOUS at 09:05

## 2019-03-23 RX ADMIN — SODIUM CHLORIDE 10 ML: 9 INJECTION INTRAMUSCULAR; INTRAVENOUS; SUBCUTANEOUS at 12:43

## 2019-03-23 ASSESSMENT — PAIN SCALES - GENERAL: PAINLEVEL_OUTOF10: 0

## 2019-03-23 NOTE — DISCHARGE INSTR - COC
Continuity of Care Form    Patient Name: Omayra Nathan   :  1975  MRN:  [id number]    Admit date:  3/21/2019  Discharge date:  ***    Code Status Order: Full Code   Advance Directives:     Admitting Physician:  Luis Obando MD  PCP: No primary care provider on file. Discharging Nurse: [facility] Unit/Room#: 9406/5812-76  Discharging Unit Phone Number: ***    Emergency Contact:   Extended Emergency Contact Information  Primary Emergency Contact: no, none  Home Phone: [phone]  Relation: Other    Past Surgical History:  Past Surgical History:   Procedure Laterality Date    ROTATOR CUFF REPAIR      right side       Immunization History: There is no immunization history on file for this patient.     Active Problems:  Patient Active Problem List   Diagnosis Code    PTSD (post-traumatic stress disorder) F43.10    Polysubstance abuse ([address] F19.10    Respiratory acidosis E87.2    MARY (acute kidney injury) ([address] N17.9    Transaminitis R74.0    Aspiration pneumonia ([address] J69.0    Acute aspiration pneumonia ([address] J69.0    Non-traumatic rhabdomyolysis M62.82    Pneumonia due to organism J18.9       Isolation/Infection:   Isolation          No Isolation            Nurse Assessment:  Last Vital Signs: /87   Pulse 74   Temp 98.1 °F (36.7 °C) (Oral)   Resp 16   Ht 5' 8\" (1.727 m)   Wt 220 lb 14.4 oz (100.2 kg)   SpO2 95%   BMI 33.59 kg/m²     Last documented pain score (0-10 scale): Pain Level: 0  Last Weight:   Wt Readings from Last 1 Encounters:   19 220 lb 14.4 oz (100.2 kg)     Mental Status:  {IP PT MENTAL STATUS:}    IV Access:  {[facility] IV ACCESS:426735703}    Nursing Mobility/ADLs:  Walking   {P DME MJGL:100080610}  Transfer  {P DME POM}  Bathing  {P DME VRFQ:351193049}  Dressing  {P DME ATSO:835197266}  Toileting  {CHP DME QWSS:140808603}  Feeding  {P DME NKOM:107653338}  Med Admin  {[facility] IPWQ:534948494}  Med Delivery   { JULIO MED Delivery:860536880}    Wound Care Documentation and Therapy:        Elimination:  Continence:   · Bowel: {YES / HC:51367}  · Bladder: {YES / CI:05296}  Urinary Catheter: {Urinary Catheter:797851375}   Colostomy/Ileostomy/Ileal Conduit: {YES / EJ:92605}       Date of Last BM: ***    Intake/Output Summary (Last 24 hours) at 3/23/2019 0946  Last data filed at 3/23/2019 0603  Gross per 24 hour   Intake 3122 ml   Output --   Net 3122 ml     I/O last 3 completed shifts: In: 3122 [P.O.:1200;  I.V.:1922]  Out: -     Safety Concerns:     [employer] Safety Concerns:703986919}    Impairments/Disabilities:      [employer] Impairments/Disabilities:662459054}    Nutrition Therapy:  Current Nutrition Therapy:   [employer] Diet List:835369367}    Routes of Feeding: {CHP DME Other Feedings:555423327}  Liquids: {Slp liquid thickness:06707}  Daily Fluid Restriction: {CHP DME Yes amt example:273733735}  Last Modified Barium Swallow with Video (Video Swallowing Test): {Done Not Done NVSN:446924629}    Treatments at the Time of Hospital Discharge:   Respiratory Treatments: ***  Oxygen Therapy:  {Therapy; copd oxygen:13050}  Ventilator:    {[facility] Vent OOAE:277320169}    Rehab Therapies: {THERAPEUTIC INTERVENTION:0884029904}  Weight Bearing Status/Restrictions: [address]  Weight Bearin}  Other Medical Equipment (for information only, NOT a DME order):  {EQUIPMENT:924141433}  Other Treatments: ***    Patient's personal belongings (please select all that are sent with patient):  {CHP DME Belongings:318141872}    RN SIGNATURE:  {Esignature:416375555}    CASE MANAGEMENT/SOCIAL WORK SECTION    Inpatient Status Date: ***    Readmission Risk Assessment Score:  Readmission Risk              Risk of Unplanned Readmission:        19           Discharging to Facility/ Agency   · Name:   · Address:  · Phone:  · Fax:    Dialysis Facility (if applicable)   · Name:  · Address:  · Dialysis Schedule:  · Phone:  · Fax:    / signature: {Esignature:610998807}    PHYSICIAN SECTION    Prognosis: {Prognosis:1337142857}    Condition at Discharge: [address] Patient Condition:980209953}    Rehab Potential (if transferring to Rehab): {Prognosis:0805705131}    Recommended Labs or Other Treatments After Discharge: ***    Physician Certification: I certify the above information and transfer of Tone Barksdale  is necessary for the continuing treatment of the diagnosis listed and that she requires {Admit to Appropriate Level of Care:93554} for {GREATER/LESS:023488916} 30 days.      Update Admission H&P: {CHP DME Changes in QVYRP:473363525}    PHYSICIAN SIGNATURE:  Electronically signed by Johanna Martin MD on 3/23/19 at 9:46 AM

## 2019-03-23 NOTE — PROGRESS NOTES
Physical Therapy  DATE: 3/23/2019    NAME: David Yusuf  MRN: [id number]   : 1975    Patient not seen this date for Physical Therapy due to:  [] Blood transfusion in progress  [] Hemodialysis  [x]  Patient Declined-stated she is up and ambulating independently in room-defer PT eval at this time  [] Spine Precautions   [] Strict Bedrest  [] Surgery/ Procedure  [] Testing      [] Other        [] PT being discontinued at this time. Patient independent. No further needs. [] PT being discontinued at this time as the patient has been transferred to palliative care. No further needs.     Coreen Carrillo, PT

## 2019-03-23 NOTE — PROGRESS NOTES
Discharge instructions given, questions answered. Call back number given.  Electronically signed by Antonio Ramires RN on 3/23/2019 at 5:32 PM

## 2019-03-23 NOTE — PROGRESS NOTES
[address] [facility].    PROGRESS NOTE             Date:   3/23/2019  Patient name:  Arian Artis  Date of admission:  3/21/2019  9:53 AM  MRN:   [id number]  YOB: 1975    CHIEF COMPLAINT     Chief Complaint   Patient presents with    Fatigue     Pt reports hanging out with friend last night, drank some beers and smoked some marijuana and indulged cocaine. Pt reports cocaine may have been laced with something because she has \"never felt this way. \" No c/o chest pain. HISTORY OF PRESENT ILLNESS      The patient is a 39 y.o.  female who is admitted to the hospital for polysubstance abuse. Patient reports hanging out with friends last night and smoking some marijuana and cocaine with few beers. Patient reports she felt very weak and tired after that. Patient states she was in the bed all night and  her mom woke her up this morning and decided to bring her to hospital because of generalized fatigue/weakness. She also reports associated nausea and vomiting. Patient believed that her cocaine was laced with some other substance like fentanyl. She reports she occasionally does cocaine but she never felt like this. Patient denies fever, chills, headache, chest pain, shortness of breath, belly pain, changes in urinary or bowel habits, focal neurological deficit. On arrival to ED patient was arousalable to stimuli and alert and oriented ×4   but very somnolent. She was also hypoxic. She was initially given 0.5 mg of Narcan which improved her mentation and vomiting. Urine drug screen did not show opiates but is positive for cocaine and marijuana. Blood tox also negative for ethanol, Tylenol and salicylate. CTA chest was done that showed bilateral infiltrates likely aspiration pneumonia. CK and myoglobin are also elevated likely because of nontraumatic rhabdomyolysis.     She also had MARY and  elevated liver enzymes likely because of rhabdomyolysis     CURRENT EVALUATION :3/23/2019  Patient states she is feeling stronger but still not back her baseline and feel tired. She is ambulating  VS stable, Afebrile  LFT's and Creatinine improving. CK improved. Intake/Output Summary (Last 24 hours) at 3/23/2019 1402  Last data filed at 3/23/2019 1235  Gross per 24 hour   Intake 3773 ml   Output --   Net 3773 ml     Patient Vitals for the past 96 hrs (Last 3 readings):   Weight   03/23/19 0600 220 lb 14.4 oz (100.2 kg)   03/22/19 1106 200 lb (90.7 kg)   03/21/19 1005 200 lb (90.7 kg)       PAST MEDICAL HISTORY       has a past medical history of PTSD (post-traumatic stress disorder). PAST SURGICAL HISTORY       has a past surgical history that includes Rotator cuff repair (2003). HOME MEDICATIONS        Prior to Admission medications    Not on File       ALLERGIES      Darvocet a500 [propoxyphene n-acetaminophen]    SOCIAL HISTORY            FAMILY HISTORY      family history includes Cancer in her father; Diabetes in her maternal grandfather; Hypertension in her mother. REVIEW OF SYSTEMS      · Constitutional: Positive for generalized body weakness and fatigue. · Eyes: Negative for visual changes, diplopia  · ENT: Negative for mouth sores, sore throat. · Cardiovascular: Negative for lightheadedness ,chest pain, palpitations   · Respiratory:Negative for Shortness of breath,cough or wheezing. · Gastrointestinal: Negative for nausea/vomiting, change in bowel habits, abdominal pain  · Genitourinary:Negative for change in bladder habits, dysuria, trouble voiding, hematuria.   · Musculoskeletal: Negative for joint pain   · Neurological: Negative for headache, dizziness, change in muscle strength, numbness/tingling  · Psychiatric: negative for change in mood, affect    PHYSICAL EXAM      BP (!) 140/96   Pulse 69   Temp 98.7 °F (37.1 °C) (Oral)   Resp 15   Ht 5' 8\" (1.727 m)   Wt 220 lb 14.4 oz (100.2 kg)   SpO2 95%   BMI 33.59 kg/m²      · General appearance: well nourished  · HEENT: Head: Normocephalic, no lesions, without obvious abnormality. · Lungs: clear to auscultation bilaterally  · Heart: regular rate and rhythm, S1, S2 normal, no murmur  · Abdomen: soft, non-tender; bowel sounds normal; no masses,  no organomegaly  · Extremities: extremities normal, atraumatic, no cyanosis or edema  · Neurological:  Reflexes normal and symmetric. Sensation grossly normal  · Eye no icterus no redness  · Psych-normal affect      DIAGNOSTICS      Laboratory Testing:  CBC:   Recent Labs     03/23/19  0636   WBC 11.6*   HGB 9.2*        BMP:    Recent Labs     03/21/19  1539 03/22/19  0629  03/23/19  0636   NA  --  133*  --  135   K  --  5.9*   < > 4.3   CL  --  106  --  104   CO2  --  19*  --  20   BUN  --  15  --  13   CREATININE 0.87 1.02*  --  1.04*   GLUCOSE  --  98  --  87    < > = values in this interval not displayed. S. Calcium:  Recent Labs     03/23/19  0636   CALCIUM 8.2*     S. Ionized Calcium:No results for input(s): IONCA in the last 72 hours. S. Magnesium:  Recent Labs     03/21/19  1037   MG 2.2     S. Phosphorus:  Recent Labs     03/21/19  1037   PHOS 5.8*     S. Glucose:  Recent Labs     03/21/19  1143 03/21/19  1251 03/21/19  1539   POCGLU 100 150* 77     Glycosylated hemoglobin A1C: No results for input(s): LABA1C in the last 72 hours.   INR:   Recent Labs     03/21/19  1037   INR 1.0     Hepatic functions:   Recent Labs     03/23/19  0636   ALKPHOS 48   *   AST 81*   PROT 6.3*   BILITOT 0.34   BILIDIR 0.12   LABALBU 3.4*     Cardiac enzymes:  Recent Labs     03/22/19  0132 03/22/19  0629 03/23/19  0636   CKTOTAL 1,326* 1,314* 435*         Imaging/Diagonstics:    ASSESSMENT     Principal Problem:    Polysubstance abuse (HCC)  Active Problems:    Respiratory acidosis    MARY (acute kidney injury) ([address]    Transaminitis    Aspiration pneumonia (HCC)    Acute aspiration pneumonia (HCC)    Non-traumatic rhabdomyolysis    Pneumonia due to organism  Resolved Problems:    * No resolved hospital problems. *        PLAN      · Monitor vitals and mentation. · Stop Zosyn and start on oral Augmentin. Continue Augmentin for total 7 days. · Continue IV fluid at 100 mL/hour. · Follow LFTs and CK. · Monitor creatinine. · DVT prophylaxis: Lovenox  · Diet: General  · PT/OT evaluation  · Zofran for nausea/vomiting  · Okay to discharge home today with Augmentin for 7 days, repeat CMP in a week. Patient will follow with me in  clinic after a week. We'll also do repeat chest x-ray in a month.             Tracy Paez MD  PGY-3, Internal medicine resident  [facility] [place], [place]

## 2019-03-23 NOTE — PROGRESS NOTES
CLINICAL PHARMACY NOTE: MEDS TO [address] [place] [address] Drive Select Patient?: No  Total # of Prescriptions Filled: 3   The following medications were delivered to the patient:  · Pantoprazole 20mg  · Ondansetron 4mg  · Amoxicillin-pot clavul 875-125mg  Total # of Interventions Completed: 0  Time Spent (min): 0    Additional Documentation: meds delivered 3-23-19

## 2019-03-23 NOTE — PLAN OF CARE
Problem: Falls - Risk of:  Goal: Will remain free from falls  Description  Will remain free from falls  3/23/2019 9697 by Rachelle Beck RN  Note:   Fall risk precautions in place. Bed in lowest position with wheels locked, bed alarm in place and activated, non-skid socks on pt, fall risk id on pt, call light in reach, pt encouraged to call before getting out of bed and for any other needs or c/o.

## 2019-03-24 NOTE — DISCHARGE SUMMARY
[address] [place] [address]     Department of Internal Medicine - Staff Internal Medicine Service    INPATIENT DISCHARGE SUMMARY      PATIENT IDENTIFICATION:  NAME:  Nasreen Martin   :   1975  MRN:    [id number]     Acct:    [de-identified]   Admit Date:  3/21/2019  Discharge date:  3/23/2019  Attending Provider: No att. providers found                                     REASON FOR HOSPITALIZATION:   Nasreen Martin is a 39 y.o. female who presented with   Chief Complaint   Patient presents with    Fatigue     Pt reports hanging out with friend last night, drank some beers and smoked some marijuana and indulged cocaine. Pt reports cocaine may have been laced with something because she has \"never felt this way. \" No c/o chest pain. DIAGNOSES:  Primary:   Acute aspiration pneumonia ([address] [J69.0]  Acute aspiration pneumonia ([address] [J69.0]    Secondary: Active Hospital Problems    Diagnosis Date Noted    Pneumonia due to organism [J18.9]     Polysubstance abuse ([address] [F19.10] 2019    Respiratory acidosis [E87.2] 2019    MARY (acute kidney injury) ([address] [N17.9] 2019    Transaminitis [R74.0] 2019    Aspiration pneumonia ([address] [J69.0] 2019    Acute aspiration pneumonia ([address] [J69.0] 2019    Non-traumatic rhabdomyolysis [M62.82] 2019       TREATMENT:  Brief Inpatient Course: The patient is a 39 y. o.  female who is admitted to the hospital for polysubstance abuse. Patient reports hanging out with friends the night before admission and smoking some marijuana and cocaine with few beers.  Patient reported she felt very weak and tired after that. Eyal Butler states she was in the bed all night and  her mom woke her up this morning and decided to bring her to hospital because of generalized fatigue/weakness.  She also reports associated nausea and vomiting.  Patient believed that her cocaine was laced with some other substance like fentanyl.  She reported she occasionally do cocaine but she never felt like this. On arrival to ED patient was arousalable to stimuli and alert and oriented ×4   but very somnolent.  She was also hypoxic. She was initially given 0.5 mg of Narcan which improved her mentation and vomiting.  Urine drug screen did not show opiates but is positive for cocaine and marijuana. .    CTA chest was done that showed bilateral infiltrates likely aspiration pneumonia.  CK and myoglobin are also elevated likely because of nontraumatic rhabdomyolysis.    She also had MARY and  elevated liver enzymes likely because of rhabdomyolysis . She was hydrated, given antibiotics for aspiration pneumonia. Patient started to feel better, his creatinine and LFT's improved. CK normalized. Patient was discharged home on PO Augmentin for 7 days and plan to follow up at [facility] with repeat CMP to follow LFT's, Creatinine and CXR in a month. Consults:   none    Procedures:  none    Any Hospital Acquired Infections: none    PATIENT'S DISCHARGE CONDITION:    Stable, alert and oriented.   PATIENT/FAMILY INSTRUCTIONS:   Discharge Medication List as of 3/23/2019  5:31 PM      START taking these medications    Details   amoxicillin-clavulanate (AUGMENTIN) 875-125 MG per tablet Take 1 tablet by mouth 2 times daily for 7 days, Disp-14 tablet, R-0Normal      pantoprazole (PROTONIX) 20 MG tablet Take 1 tablet by mouth daily for 7 days, Disp-7 tablet, R-0Normal      ondansetron (ZOFRAN) 4 MG tablet Take 1 tablet by mouth daily as needed for Nausea or Vomiting, Disp-30 tablet, R-0Normal           Activity: activity as tolerated    Diet: regular diet    Disposition: home    Follow-up:  in 7 days with  Triny Bertrand MD  [address] [place] [address] [address] [facility] [address] [phone]            Follow up labs: CMP in a week    Follow up imaging: CXR in a month    Time Spent on discharge is more than 35 minutes in the examination, evaluation, counseling and review of medications and discharge Estefania Delvalle MD  PGY-3, Internal medicine resident  [facility], [place], [place]

## 2019-03-27 LAB
CULTURE: NORMAL
CULTURE: NORMAL
Lab: NORMAL
Lab: NORMAL
SPECIMEN DESCRIPTION: NORMAL
SPECIMEN DESCRIPTION: NORMAL

## 2020-11-07 ENCOUNTER — HOSPITAL ENCOUNTER (EMERGENCY)
Age: 45
Discharge: HOME | End: 2020-11-07
Attending: EMERGENCY MEDICINE
Payer: MEDICAID

## 2020-11-07 VITALS
OXYGEN SATURATION: 99 % | TEMPERATURE: 98.3 F | HEIGHT: 68 IN | SYSTOLIC BLOOD PRESSURE: 144 MMHG | BODY MASS INDEX: 33.34 KG/M2 | RESPIRATION RATE: 16 BRPM | WEIGHT: 220 LBS | DIASTOLIC BLOOD PRESSURE: 112 MMHG | HEART RATE: 77 BPM

## 2020-11-07 PROCEDURE — 99282 EMERGENCY DEPT VISIT SF MDM: CPT

## 2020-11-07 RX ORDER — PREDNISONE 10 MG/1
TABLET ORAL
Qty: 20 TABLET | Refills: 0 | Status: SHIPPED | OUTPATIENT
Start: 2020-11-07 | End: 2020-11-17

## 2020-11-07 RX ORDER — NAPROXEN 500 MG/1
500 TABLET ORAL 2 TIMES DAILY WITH MEALS
COMMUNITY

## 2020-11-07 ASSESSMENT — PAIN DESCRIPTION - ORIENTATION: ORIENTATION: LEFT

## 2020-11-07 ASSESSMENT — PAIN SCALES - GENERAL: PAINLEVEL_OUTOF10: 6

## 2020-11-07 ASSESSMENT — PAIN DESCRIPTION - LOCATION: LOCATION: HAND;WRIST

## 2020-11-07 ASSESSMENT — PAIN DESCRIPTION - DESCRIPTORS: DESCRIPTORS: TINGLING;NUMBNESS;BURNING

## 2020-11-07 NOTE — ED PROVIDER NOTES
Attending Supervisory Note/Shared Visit   I have personally performed a face to face diagnostic evaluation on this patient. I have reviewed the mid-levels findings and agree.         (Please note that portions of this note were completed with a voice recognition program.  Efforts were made to edit the dictations but occasionally words are mis-transcribed.)    Eliel Stein MD  Attending Emergency Physician        Eliel Stein MD  11/07/20 5401

## 2020-11-07 NOTE — ED PROVIDER NOTES
[id number] [facility] ED  [address] [facility] [address]  Phone: [phone]  Fax: [phone]        Pt Name: Scotty Orta  MRN: [id number]  [place] 1975  Date of evaluation: 11/7/20    [address] [place] [address]       Chief Complaint   Patient presents with    Hand Pain    Wrist Pain       HISTORY OF PRESENT ILLNESS (Location/Symptom, Timing/Onset, Context/Setting, Quality, Duration, Modifying Factors, Severity)      Scotty Orta is a 54 y.o. female with no pertinent PMH who presents to the ED via private auto with ankle/foot pain. Patient states that for the past 3 weeks she has been experiencing intermittent numbness, tingling, and pain to the medial aspect of her left forearm and primarily to the left wrist, hand, and fourth (medial aspect) and fifth digits. Patient has exacerbation of the pain with movement and improves with rest.  She states that she went to urgent care 2 weeks ago and they gave her a wrist brace and naproxen and she has been taking these and wearing this, but does have exacerbation of the pain on wearing the wrist brace that she has not been doing that. 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 Denies any fever, chills, weakness, pain to the surrounding joints, any other injuries, or any other concerns at this time. PAST MEDICAL / SURGICAL / SOCIAL / FAMILY HISTORY     PMH:  has a past medical history of PTSD (post-traumatic stress disorder). Surgical History:  has a past surgical history that includes Rotator cuff repair (2003). Social History:    Family History: She indicated that the status of her mother is unknown. She indicated that the status of her father is unknown. She indicated that the status of her maternal grandfather is unknown.   family history includes Cancer in her father; Diabetes in her maternal grandfather; Hypertension in her mother.   Psychiatric History: None    Allergies: Darvocet a500 [propoxyphene n-acetaminophen]    Home Medications:   Prior to Admission medications    Medication Sig Start Date End Date Taking? Authorizing Provider   naproxen (NAPROSYN) 500 MG tablet Take 500 mg by mouth 2 times daily (with meals)   Yes Historical Provider, MD   predniSONE (DELTASONE) 10 MG tablet Take 4 tablets by mouth once daily for 5 days 11/7/20 11/17/20 Yes Lev Calix PA-C       REVIEW OF SYSTEMS  (2-9 systems for level 4, 10 ormore for level 5)      Review of Systems    Constitutional: See HPI. HEENT: Denies sore throat or neck pain. Respiratory: Denies cough or shortness of breath. Cardiovascular: Denies chest pain. Musculoskeletal: See HPI. Skin: Denies new rashes or wounds. Neurologic:  See HPI. All other systems negative except as marked. PHYSICAL EXAM  (up to 7 for level 4, 8 or more for level 5)      INITIAL VITALS:  height is 5' 8\" (1.727 m) and weight is 99.8 kg (220 lb). Her temporal temperature is 98.3 °F (36.8 °C). Her blood pressure is 144/112 (abnormal) and her pulse is 77. Her respiration is 16 and oxygen saturation is 99%. Vital signs reviewed. Physical Exam    General:  Alert, cooperative, well-groomed, well-nourished, appears stated age, and is in no acute distress. Head:  Normocephalic, atraumatic, and without obvious abnormality. Eyes:  Sclerae/conjunctivae clear without injection, pallor, or icterus. Corneas clear without opacities. EOM's intact. ENT: Ears and nose are all without obvious masses lesion or deformity. No oropharynx examination performed due to aerosolization risk during COVID-19 pandemic. Neck: Supple and symmetrical. Trachea midline. No adenopathy. Musculoskeletal: The left upper extremity is normal in appearance with mild tenderness to palpation to the medial aspect of the left forearm, wrist, and fourth and fifth fingers. Positive Tinel's at the cubital tunnel.  No deformities, ecchymosis, edema, erythema, warmth, abrasions, or lacerations to the area. Good ROM of the digits, wrist, elbow, and shoulder. Ankle strength 5/5. Sensation intact to light touch, but diminished to the medial aspect of the left hand and fifth finger and medial aspect of the fourth finger. Radial pulses 2+ and symmetrical. Cap refill <2 seconds. Extremities: Warm and dry without erythema or edema. No venous stasis changes. Skin: Soft, good turgor, and well-hydrated. No obvious rashes or lesions. Neurologic: GCS is 15 and no focal deficits are appreciated. Normal gait. Grossly normal motor and sensation. Speech clear. Psychiatric: Normal mood and affect. Normal behavior. Coherent thought process. DIFFERENTIAL DIAGNOSIS / MDM     The patient presented to the ED with atraumatic left fifth and fourth finger numbness, tingling, and pain that is consistent with an ulnar neuropathy. Vital signs demonstrate hypertension at 144/112 and are otherwise unremarkable; she is asymptomatic and will recheck prior to discharge. Shoulder joint was not affected and the wrist and elbow have full range of motion and 5/5 strength. Pulses are 2+. Sensation is intact the decreased on the medial aspect of the fourth finger and to the fifth finger and along the medial aspect of the left hand. There are no lesions, lacerations, or signs of compartment syndrome. An x-ray was offered, but the patient declined. The patient will be discharged home with a supportive care instructions, instructions to  a an elbow compression sleeve, and a prescription for prednisone. The patient was instructed to follow up in 2-3 days with their family doctor and was given the number for the orthopedist if she chooses to follow-up with them for further testing/evaluation.   Additionally, I did repeat the patient's blood pressure prior to discharge and it was still slightly elevated at 138/98 and she was informed that they may have pre-hypertension or hypertension based on a blood pressure reading in the emergency department. I recommend that the patient call the primary care provider listed on their discharge instructions or a physician of their choice this week to arrange follow up for further evaluation of possible pre-hypertension or Hypertension. PLAN (LABS / IMAGING / EKG):  No orders of the defined types were placed in this encounter. MEDICATIONS ORDERED:  Orders Placed This Encounter   Medications    predniSONE (DELTASONE) 10 MG tablet     Sig: Take 4 tablets by mouth once daily for 5 days     Dispense:  20 tablet     Refill:  0       Controlled Substances Monitoring:     DIAGNOSTIC RESULTS     EKG: All EKG's are interpreted by the Emergency Department Physician who either signs or Co-signs this chart in the absenceof a cardiologist.    RADIOLOGY:  Non-plain film images such as CT, Ultrasound and MRI are read by the radiologist. Plain radiographic images are visualized by me and the following radiologist interpretations are reviewed. No results found. LABS:  No results found for this visit on 11/07/20. EMERGENCY DEPARTMENT COURSE           Vitals:    Vitals:    11/07/20 1525   BP: (!) 144/112   Pulse: 77   Resp: 16   Temp: 98.3 °F (36.8 °C)   TempSrc: Temporal   SpO2: 99%   Weight: 99.8 kg (220 lb)   Height: 5' 8\" (1.727 m)     -------------------------  BP: (!) 144/112, Temp: 98.3 °F (36.8 °C), Pulse: 77, Resp: 16      RE-EVALUATION:  No re-evaluation was necessary as patient was discharged home after first impression from myself and the attending. The patient and I have discussed the diagnosis and risks, and we agree with discharging home to follow-up with their primary doctor. The patient appears stable for discharge and has been instructed to return immediately for new concerning symptoms, if the symptoms worsen in any way, or in 8-12 hours if not improved for re-evaluation.  We also discussed returning to the Emergency Department immediately if new or worsening symptoms occur. We have discussed the symptoms which are most concerning (e.g., increasing pain, numbness, weakness, color change or coldness to the extremity, fever) that necessitate immediate return. The patient understands that at this time there is no evidence for a more malignant underlying process, but the patient also understands that early in the process of an illness or injury, an emergency department workup can be falsely reassuring. Routine discharge counseling was given, and the patient understands that worsening, changing or persistent symptoms should prompt an immediate call or follow up with their primary physician or return to the emergency department. The importance of appropriate follow up was also discussed. I have reviewed the disposition diagnosis with the patient and or their family/guardian. I have answered their questions and given discharge instructions. They voiced understanding of these instructions and did not have any further questions or complaints. This patient was seen by the attending physician and they agreed with the assessment and plan. CONSULTS:  None    PROCEDURES:  None    FINAL IMPRESSION      1. Ulnar neuropathy at elbow of left upper extremity          DISPOSITION / PLAN     CONDITION ON DISPOSITION:   Good / Stable for discharge.      PATIENT REFERRED TO:  [facility]  [facility] [address]  [phone]  Call   To schedule an appointment for follow-up      DISCHARGE MEDICATIONS:  Discharge Medication List as of 11/7/2020  3:58 PM      START taking these medications    Details   predniSONE (DELTASONE) 10 MG tablet Take 4 tablets by mouth once daily for 5 days, Disp-20 tablet,R-0Print             Alfonso Espinal PA-C   Emergency Medicine Physician Assistant    (Please note that portions of this note were completed with a voice recognition program.  Efforts were made to edit the dictations but occasionally words aremis-transcribed.)     Georges Rubinstein, PA-C  11/07/20 2268

## 2020-11-25 ENCOUNTER — OFFICE VISIT (OUTPATIENT)
Dept: ORTHOPEDIC SURGERY | Age: 45
End: 2020-11-25
Payer: MEDICAID

## 2020-11-25 PROCEDURE — G8484 FLU IMMUNIZE NO ADMIN: HCPCS | Performed by: ORTHOPAEDIC SURGERY

## 2020-11-25 PROCEDURE — 99203 OFFICE O/P NEW LOW 30 MIN: CPT | Performed by: ORTHOPAEDIC SURGERY

## 2020-11-25 PROCEDURE — G8417 CALC BMI ABV UP PARAM F/U: HCPCS | Performed by: ORTHOPAEDIC SURGERY

## 2020-11-25 PROCEDURE — G8427 DOCREV CUR MEDS BY ELIG CLIN: HCPCS | Performed by: ORTHOPAEDIC SURGERY

## 2020-11-25 PROCEDURE — 4004F PT TOBACCO SCREEN RCVD TLK: CPT | Performed by: ORTHOPAEDIC SURGERY

## 2020-11-25 NOTE — PROGRESS NOTES
Orthopedic Elbow Encounter Note     Chief complaint: Left elbow pain    HPI: Batsheva Mulligan is a 54 y.o. right-hand dominant female who presents for evaluation of her left elbow and arm. She indicates that about a month ago right around Halloween she woke up with numbness and tingling down her arm and pain. She states that it felt like she had hit her funny bone but her paresthesias were constant. Consequently she went to urgent care and was diagnosed with carpal tunnel syndrome. She was placed in a wrist splint but this seemed to only make her symptoms worse and so she stopped using that. She was also placed on naproxen which she continued to take. She went into emergency department the following week and was placed on steroids which has helped take the edge off some of her symptoms but at this time she continues to have a persistent burning pain associated with numbness and tingling down the ulnar border of her forearm and hand involving her small finger. Symptoms are constant but worse with use. She states that she has a difficult time flexing her small finger. She denies having any significant neck pain. She denies having any symptoms prior to this beginning at the end of October. Previous treatment:    NSAIDs: Naproxen    Injections: None    Physical therapy: No    Surgeries: None    Review of Systems:     Constitution: no fever or chills   Pain level: 5/10  Musculoskeletal: As noted in the HPI   Neurologic: numbness    Past Medical Hx, Past Surgical Hx, Medications, Allergies, Social Hx: These were all reviewed. Please refer to Electronic Medical Records for details.      Physical Exam:     General Appearance: alert, well appearing, and in no distress  Mental Status: alert, oriented to person, place, and time  Gait: normal    Elbows:    Skin: warm and dry, no rash or erythema  Vasculature: 2+ radial pulses bilaterally  Sensation: Intact to light touch in radial, ulnar, and median nerve distributions bilaterally    ROM: (Degrees)    Right   A  Left   A     Flexion   140  Flexion   140   Extension  0  Extension  0    Pronation  75  Pronaton  75   Supination  80  Supination  80     Crepitation  No  Crepitation  No    Muscle strength:    Right       Left    Biceps   5    Biceps   5  Triceps  5    Triceps  5  Wrist flexion  5    Wrist flexion  5  Wrist extension 5    Wrist extension 5  Intrinsics  5    Intrinsics  5    Tenderness: None     Tenderness: Tender to palpation medial elbow    Special tests    Right    Ulnar Nerve     Left  n    Cubital tunnel bend    y  n    Tinnel's at elbow    y        Biceps  n    Bicipital tenderness    n  n    Defect at biceps insertion   n        Instability  n    LCL instability     n  n    MCL instability     n  n    Moving valgus test    n  n    Milk sign     n  n    PLRI pivot     n        Epicondylitis  n    Resisted WE Pain    n  n    Resisted WF Pain    n  n    Resisted Supination Pain   n  n    Resisted Pronation Pain   n        Arthritis  n    Clunk      y  n    Pain at extremes of motion   y  n    Pain during arc of motion   n    Of note she has no intrinsic hand wasting. She is weak with abduction of her fingers in the left hand and cannot fully flex her small finger. Imaging:  Xrays: 3 views of the left elbow obtained on 11/25/2020 were independently reviewed  Indications: Left elbow pain  Findings: Normal joint spaces without obvious fracture, dislocation, subluxation or osseous abnormality. Impression: Normal left elbow radiographs. Impression/Plan:     Tia Mac is a 54 y.o. old female with findings consistent with left cubital tunnel syndrome. I had a discussion today educating her about this issue and discussed treatment options. Given the acute onset of her symptoms and the severity I would recommend further assessment with electrodiagnostic testing as surgery might be warranted in this situation.   We did discuss nonoperative treatment as well and I would recommend she begin some nighttime splinting of her elbow. We will facilitate her getting her study completed and like to see her back for reevaluation afterwards but she may return or call earlier with questions under concerns.       NA = Not assessed  y = Yes  n = No

## 2020-12-10 ENCOUNTER — TELEPHONE (OUTPATIENT)
Dept: ORTHOPEDIC SURGERY | Age: 45
End: 2020-12-10

## 2020-12-10 NOTE — TELEPHONE ENCOUNTER
Called neurology office to inform of patient's phone number which has voicemail. Gave phone number [phone]. Confirmed they have the order.

## 2020-12-10 NOTE — TELEPHONE ENCOUNTER
Called patient regarding Neurology referral which has been unable to be scheduled, due to difficulty reaching the patient. This \"mobile\" phone number does have voicemail, so a detailed message with phone numbers for neurology office and ortho office was left for the patient. Urged patient to please call the neurology office to schedule and / or ortho office with questions. Patient's MyChart status is still Pending, so a message cannot be sent in that way.

## 2020-12-21 ENCOUNTER — TELEPHONE (OUTPATIENT)
Dept: ORTHOPEDIC SURGERY | Age: 45
End: 2020-12-21

## 2020-12-21 NOTE — TELEPHONE ENCOUNTER
Called again regarding referral to neurology. Apparently, Dr. Rbui Young office does not accept [facility]. Left voicemail stating this and advised patient to call her insurance company to get a list of neurologists whom her insurance does cover. Once she has chosen one, we would be happy to fax her referral order and records to that office. Please follow up by calling this office at: [phone].

## 2020-12-22 ENCOUNTER — HOSPITAL ENCOUNTER (OUTPATIENT)
Dept: NEUROLOGY | Age: 45
Discharge: HOME | End: 2020-12-22
Payer: MEDICAID

## 2020-12-22 PROCEDURE — 95911 NRV CNDJ TEST 9-10 STUDIES: CPT | Performed by: PHYSICAL MEDICINE & REHABILITATION

## 2020-12-22 PROCEDURE — 95886 MUSC TEST DONE W/N TEST COMP: CPT | Performed by: PHYSICAL MEDICINE & REHABILITATION

## 2021-01-11 ENCOUNTER — TELEPHONE (OUTPATIENT)
Dept: ORTHOPEDIC SURGERY | Age: 46
End: 2021-01-11

## 2021-01-11 NOTE — TELEPHONE ENCOUNTER
Please inform patient I reviewed results. Significant ulnar nerve neuropathy.  I would recommend follow up in person to discuss results and treatment options ASAP

## 2021-02-10 ENCOUNTER — OFFICE VISIT (OUTPATIENT)
Dept: ORTHOPEDIC SURGERY | Age: 46
End: 2021-02-10
Payer: MEDICAID

## 2021-02-10 VITALS — BODY MASS INDEX: 33.34 KG/M2 | HEIGHT: 68 IN | WEIGHT: 220 LBS | TEMPERATURE: 98 F

## 2021-02-10 DIAGNOSIS — Z01.818 PRE-OP TESTING: ICD-10-CM

## 2021-02-10 DIAGNOSIS — G56.22 CUBITAL TUNNEL SYNDROME ON LEFT: Primary | ICD-10-CM

## 2021-02-10 PROCEDURE — G8484 FLU IMMUNIZE NO ADMIN: HCPCS | Performed by: ORTHOPAEDIC SURGERY

## 2021-02-10 PROCEDURE — 99214 OFFICE O/P EST MOD 30 MIN: CPT | Performed by: ORTHOPAEDIC SURGERY

## 2021-02-10 PROCEDURE — G8417 CALC BMI ABV UP PARAM F/U: HCPCS | Performed by: ORTHOPAEDIC SURGERY

## 2021-02-10 PROCEDURE — G8427 DOCREV CUR MEDS BY ELIG CLIN: HCPCS | Performed by: ORTHOPAEDIC SURGERY

## 2021-02-10 PROCEDURE — 4004F PT TOBACCO SCREEN RCVD TLK: CPT | Performed by: ORTHOPAEDIC SURGERY

## 2021-02-10 RX ORDER — NAPROXEN 500 MG/1
500 TABLET ORAL 2 TIMES DAILY PRN
Qty: 60 TABLET | Refills: 0 | Status: SHIPPED | OUTPATIENT
Start: 2021-02-10

## 2021-02-14 NOTE — PROGRESS NOTES
HPI: Ms. Violet Crocker is here today to review the results of her left elbow nerve testing which was completed on 12/22/21. Since our last visit she's been treating her symptoms with night time splinting as discussed. She has persistent pain associated with numbness and tingling along the ulna side of her hand and weakness. I did review the results of her EMG study from 12/22/2020 which demonstrates ulnar nerve neuropathy with demyelinating and axonal loss. I had a discussion with the patient today with regards to the meaning of her nerve testing findings. As already stated she has persistent symptoms in spite of nighttime splinting. We did discuss treatment options including continued conservative management versus surgical intervention. Due to the severity of her symptoms and clinical findings I believe she would be a candidate for surgical intervention by way of an open in situ ulnar nerve decompression at the elbow with possible anterior subcutaneous ulnar nerve transposition. We discussed in detail what surgery would entail in terms of the procedure, risks and benefits of surgery, expected outcome and postoperative recovery course. Risks as discussed included but were not limited to risk of infection, wound healing problems, persistent pain and paresthesias, persistent weakness, stiffness,  hematoma, temporary and/or permanent nerve injury, and risk of anesthesia. She demonstrated a good understanding of our discussion and would like to proceed with surgery. All questions were appropriately answered. We will schedule her for surgery at her convenience and facilitate her getting appropriate preoperative medical clearance. I have spent 25 minutes face-to-face with the patient more than 50% of this time was spent counseling and coordinating care as outlined above. The patient was counseled at length about the risks of naye Covid-19 during their perioperative period and any recovery window from their procedure. The patient was made aware that naye Covid-19  may worsen their prognosis for recovering from their procedure  and lend to a higher morbidity and/or mortality risk. All material risks, benefits, and reasonable alternatives including postponing the procedure were discussed. The patient does wish to proceed with the procedure at this time.
